# Patient Record
Sex: MALE | Race: WHITE | NOT HISPANIC OR LATINO | Employment: OTHER | URBAN - METROPOLITAN AREA
[De-identification: names, ages, dates, MRNs, and addresses within clinical notes are randomized per-mention and may not be internally consistent; named-entity substitution may affect disease eponyms.]

---

## 2023-06-16 LAB
ALANINE AMINOTRANSFERASE (SGPT) (U/L) IN SER/PLAS: 32 U/L (ref 10–52)
ALBUMIN (G/DL) IN SER/PLAS: 4.5 G/DL (ref 3.4–5)
ALBUMIN (MG/L) IN URINE: <7 MG/L
ALBUMIN/CREATININE (UG/MG) IN URINE: NORMAL UG/MG CRT (ref 0–30)
ALKALINE PHOSPHATASE (U/L) IN SER/PLAS: 52 U/L (ref 33–136)
ANION GAP IN SER/PLAS: 11 MMOL/L (ref 10–20)
ASPARTATE AMINOTRANSFERASE (SGOT) (U/L) IN SER/PLAS: 20 U/L (ref 9–39)
BASOPHILS (10*3/UL) IN BLOOD BY AUTOMATED COUNT: 0.03 X10E9/L (ref 0–0.1)
BASOPHILS/100 LEUKOCYTES IN BLOOD BY AUTOMATED COUNT: 0.6 % (ref 0–2)
BILIRUBIN TOTAL (MG/DL) IN SER/PLAS: 0.7 MG/DL (ref 0–1.2)
CALCIUM (MG/DL) IN SER/PLAS: 9.7 MG/DL (ref 8.6–10.3)
CARBON DIOXIDE, TOTAL (MMOL/L) IN SER/PLAS: 29 MMOL/L (ref 21–32)
CHLORIDE (MMOL/L) IN SER/PLAS: 106 MMOL/L (ref 98–107)
CHOLESTEROL (MG/DL) IN SER/PLAS: 160 MG/DL (ref 0–199)
CHOLESTEROL IN HDL (MG/DL) IN SER/PLAS: 59.1 MG/DL
CHOLESTEROL/HDL RATIO: 2.7
CREATININE (MG/DL) IN SER/PLAS: 1.34 MG/DL (ref 0.5–1.3)
CREATININE (MG/DL) IN URINE: 106 MG/DL (ref 20–370)
EOSINOPHILS (10*3/UL) IN BLOOD BY AUTOMATED COUNT: 0.14 X10E9/L (ref 0–0.7)
EOSINOPHILS/100 LEUKOCYTES IN BLOOD BY AUTOMATED COUNT: 2.9 % (ref 0–6)
ERYTHROCYTE DISTRIBUTION WIDTH (RATIO) BY AUTOMATED COUNT: 11.9 % (ref 11.5–14.5)
ERYTHROCYTE MEAN CORPUSCULAR HEMOGLOBIN CONCENTRATION (G/DL) BY AUTOMATED: 33.5 G/DL (ref 32–36)
ERYTHROCYTE MEAN CORPUSCULAR VOLUME (FL) BY AUTOMATED COUNT: 93 FL (ref 80–100)
ERYTHROCYTES (10*6/UL) IN BLOOD BY AUTOMATED COUNT: 4.64 X10E12/L (ref 4.5–5.9)
GFR MALE: 57 ML/MIN/1.73M2
GLUCOSE (MG/DL) IN SER/PLAS: 124 MG/DL (ref 74–99)
HEMATOCRIT (%) IN BLOOD BY AUTOMATED COUNT: 43.3 % (ref 41–52)
HEMOGLOBIN (G/DL) IN BLOOD: 14.5 G/DL (ref 13.5–17.5)
IMMATURE GRANULOCYTES/100 LEUKOCYTES IN BLOOD BY AUTOMATED COUNT: 0.4 % (ref 0–0.9)
LDL: 84 MG/DL (ref 0–99)
LEUKOCYTES (10*3/UL) IN BLOOD BY AUTOMATED COUNT: 4.9 X10E9/L (ref 4.4–11.3)
LYMPHOCYTES (10*3/UL) IN BLOOD BY AUTOMATED COUNT: 2.05 X10E9/L (ref 1.2–4.8)
LYMPHOCYTES/100 LEUKOCYTES IN BLOOD BY AUTOMATED COUNT: 41.9 % (ref 13–44)
MONOCYTES (10*3/UL) IN BLOOD BY AUTOMATED COUNT: 0.39 X10E9/L (ref 0.1–1)
MONOCYTES/100 LEUKOCYTES IN BLOOD BY AUTOMATED COUNT: 8 % (ref 2–10)
NEUTROPHILS (10*3/UL) IN BLOOD BY AUTOMATED COUNT: 2.26 X10E9/L (ref 1.2–7.7)
NEUTROPHILS/100 LEUKOCYTES IN BLOOD BY AUTOMATED COUNT: 46.2 % (ref 40–80)
PLATELETS (10*3/UL) IN BLOOD AUTOMATED COUNT: 196 X10E9/L (ref 150–450)
POTASSIUM (MMOL/L) IN SER/PLAS: 4.8 MMOL/L (ref 3.5–5.3)
PROTEIN TOTAL: 6.9 G/DL (ref 6.4–8.2)
SODIUM (MMOL/L) IN SER/PLAS: 141 MMOL/L (ref 136–145)
TRIGLYCERIDE (MG/DL) IN SER/PLAS: 87 MG/DL (ref 0–149)
URATE (MG/DL) IN SER/PLAS: 8 MG/DL (ref 4–7.5)
UREA NITROGEN (MG/DL) IN SER/PLAS: 24 MG/DL (ref 6–23)
VLDL: 17 MG/DL (ref 0–40)

## 2023-06-18 LAB
ESTIMATED AVERAGE GLUCOSE FOR HBA1C: 126 MG/DL
HEMOGLOBIN A1C/HEMOGLOBIN TOTAL IN BLOOD: 6 %

## 2023-12-20 ENCOUNTER — LAB (OUTPATIENT)
Dept: LAB | Facility: LAB | Age: 68
End: 2023-12-20
Payer: MEDICARE

## 2023-12-20 DIAGNOSIS — R73.09 OTHER ABNORMAL GLUCOSE: Primary | ICD-10-CM

## 2023-12-20 DIAGNOSIS — M1A.9XX1 CHRONIC GOUT, UNSPECIFIED, WITH TOPHUS (TOPHI): ICD-10-CM

## 2023-12-20 LAB
ANION GAP SERPL CALC-SCNC: 9 MMOL/L (ref 10–20)
BUN SERPL-MCNC: 18 MG/DL (ref 6–23)
CALCIUM SERPL-MCNC: 9.5 MG/DL (ref 8.6–10.3)
CHLORIDE SERPL-SCNC: 105 MMOL/L (ref 98–107)
CO2 SERPL-SCNC: 30 MMOL/L (ref 21–32)
CREAT SERPL-MCNC: 1.21 MG/DL (ref 0.5–1.3)
EST. AVERAGE GLUCOSE BLD GHB EST-MCNC: 123 MG/DL
GFR SERPL CREATININE-BSD FRML MDRD: 64 ML/MIN/1.73M*2
GLUCOSE SERPL-MCNC: 116 MG/DL (ref 74–99)
HBA1C MFR BLD: 5.9 %
POTASSIUM SERPL-SCNC: 4.3 MMOL/L (ref 3.5–5.3)
SODIUM SERPL-SCNC: 140 MMOL/L (ref 136–145)
URATE SERPL-MCNC: 6 MG/DL (ref 4–7.5)

## 2023-12-20 PROCEDURE — 36415 COLL VENOUS BLD VENIPUNCTURE: CPT

## 2023-12-20 PROCEDURE — 84550 ASSAY OF BLOOD/URIC ACID: CPT

## 2023-12-20 PROCEDURE — 83036 HEMOGLOBIN GLYCOSYLATED A1C: CPT

## 2023-12-20 PROCEDURE — 80048 BASIC METABOLIC PNL TOTAL CA: CPT

## 2023-12-22 ENCOUNTER — TELEPHONE (OUTPATIENT)
Dept: PRIMARY CARE | Facility: CLINIC | Age: 68
End: 2023-12-22
Payer: MEDICARE

## 2023-12-22 NOTE — TELEPHONE ENCOUNTER
----- Message from Marlene Cardozo MD sent at 12/20/2023  9:41 PM EST -----  Overdue for 3 month follow up from June  Please schedule at which time will review labs.  Labs overall ok

## 2024-01-16 ENCOUNTER — HOSPITAL ENCOUNTER (OUTPATIENT)
Dept: RADIOLOGY | Facility: EXTERNAL LOCATION | Age: 69
Discharge: HOME | End: 2024-01-16

## 2024-01-16 DIAGNOSIS — M25.532 WRIST PAIN, ACUTE, LEFT: ICD-10-CM

## 2024-01-19 ENCOUNTER — OFFICE VISIT (OUTPATIENT)
Dept: PRIMARY CARE | Facility: CLINIC | Age: 69
End: 2024-01-19
Payer: MEDICARE

## 2024-01-19 ENCOUNTER — LAB (OUTPATIENT)
Dept: LAB | Facility: LAB | Age: 69
End: 2024-01-19
Payer: MEDICARE

## 2024-01-19 VITALS
WEIGHT: 196.8 LBS | DIASTOLIC BLOOD PRESSURE: 81 MMHG | TEMPERATURE: 97.2 F | SYSTOLIC BLOOD PRESSURE: 134 MMHG | BODY MASS INDEX: 26.66 KG/M2 | HEIGHT: 72 IN | HEART RATE: 89 BPM

## 2024-01-19 DIAGNOSIS — M10.042 ACUTE IDIOPATHIC GOUT OF LEFT HAND: ICD-10-CM

## 2024-01-19 DIAGNOSIS — Z00.00 WELL ADULT HEALTH CHECK: ICD-10-CM

## 2024-01-19 DIAGNOSIS — M10.042 ACUTE IDIOPATHIC GOUT OF LEFT HAND: Primary | ICD-10-CM

## 2024-01-19 PROBLEM — M16.11 OSTEOARTHRITIS OF RIGHT HIP: Status: ACTIVE | Noted: 2022-07-11

## 2024-01-19 PROBLEM — M1A.9XX1 TOPHUS: Status: ACTIVE | Noted: 2022-12-21

## 2024-01-19 PROBLEM — N52.9 ERECTILE DYSFUNCTION: Status: ACTIVE | Noted: 2024-01-19

## 2024-01-19 PROBLEM — C44.519 BASAL CELL CARCINOMA OF SKIN OF OTHER PART OF TRUNK: Status: RESOLVED | Noted: 2021-04-26 | Resolved: 2024-01-19

## 2024-01-19 PROBLEM — D22.5 MELANOCYTIC NEVI OF TRUNK: Status: ACTIVE | Noted: 2021-02-24

## 2024-01-19 PROBLEM — J30.9 ALLERGIC RHINITIS: Status: ACTIVE | Noted: 2024-01-19

## 2024-01-19 PROBLEM — I25.10 ATHEROSCLEROSIS OF NATIVE CORONARY ARTERY OF NATIVE HEART WITHOUT ANGINA PECTORIS: Status: ACTIVE | Noted: 2019-01-02

## 2024-01-19 PROBLEM — D36.9 TUBULAR ADENOMA: Status: ACTIVE | Noted: 2024-01-19

## 2024-01-19 PROBLEM — I10 BENIGN ESSENTIAL HYPERTENSION: Status: ACTIVE | Noted: 2022-12-21

## 2024-01-19 PROBLEM — E78.5 HYPERLIPIDEMIA: Status: ACTIVE | Noted: 2024-01-19

## 2024-01-19 LAB
ALBUMIN SERPL BCP-MCNC: 4.2 G/DL (ref 3.4–5)
ALP SERPL-CCNC: 63 U/L (ref 33–136)
ALT SERPL W P-5'-P-CCNC: 14 U/L (ref 10–52)
ANION GAP SERPL CALC-SCNC: 13 MMOL/L (ref 10–20)
AST SERPL W P-5'-P-CCNC: 11 U/L (ref 9–39)
BASOPHILS # BLD AUTO: 0.06 X10*3/UL (ref 0–0.1)
BASOPHILS NFR BLD AUTO: 0.7 %
BILIRUB SERPL-MCNC: 0.5 MG/DL (ref 0–1.2)
BUN SERPL-MCNC: 24 MG/DL (ref 6–23)
CALCIUM SERPL-MCNC: 9.8 MG/DL (ref 8.6–10.3)
CHLORIDE SERPL-SCNC: 102 MMOL/L (ref 98–107)
CO2 SERPL-SCNC: 30 MMOL/L (ref 21–32)
CREAT SERPL-MCNC: 1.15 MG/DL (ref 0.5–1.3)
CRP SERPL-MCNC: 10.38 MG/DL
EGFRCR SERPLBLD CKD-EPI 2021: 68 ML/MIN/1.73M*2
EOSINOPHIL # BLD AUTO: 0.07 X10*3/UL (ref 0–0.7)
EOSINOPHIL NFR BLD AUTO: 0.8 %
ERYTHROCYTE [DISTWIDTH] IN BLOOD BY AUTOMATED COUNT: 12.4 % (ref 11.5–14.5)
GLUCOSE SERPL-MCNC: 108 MG/DL (ref 74–99)
HCT VFR BLD AUTO: 42.6 % (ref 41–52)
HGB BLD-MCNC: 14.3 G/DL (ref 13.5–17.5)
IMM GRANULOCYTES # BLD AUTO: 0.03 X10*3/UL (ref 0–0.7)
IMM GRANULOCYTES NFR BLD AUTO: 0.3 % (ref 0–0.9)
LYMPHOCYTES # BLD AUTO: 1.45 X10*3/UL (ref 1.2–4.8)
LYMPHOCYTES NFR BLD AUTO: 16.2 %
MCH RBC QN AUTO: 32.4 PG (ref 26–34)
MCHC RBC AUTO-ENTMCNC: 33.6 G/DL (ref 32–36)
MCV RBC AUTO: 97 FL (ref 80–100)
MONOCYTES # BLD AUTO: 0.95 X10*3/UL (ref 0.1–1)
MONOCYTES NFR BLD AUTO: 10.6 %
NEUTROPHILS # BLD AUTO: 6.39 X10*3/UL (ref 1.2–7.7)
NEUTROPHILS NFR BLD AUTO: 71.4 %
NRBC BLD-RTO: 0 /100 WBCS (ref 0–0)
PLATELET # BLD AUTO: 214 X10*3/UL (ref 150–450)
POTASSIUM SERPL-SCNC: 4.5 MMOL/L (ref 3.5–5.3)
PROT SERPL-MCNC: 7 G/DL (ref 6.4–8.2)
RBC # BLD AUTO: 4.41 X10*6/UL (ref 4.5–5.9)
SODIUM SERPL-SCNC: 140 MMOL/L (ref 136–145)
URATE SERPL-MCNC: 5.7 MG/DL (ref 4–7.5)
WBC # BLD AUTO: 9 X10*3/UL (ref 4.4–11.3)

## 2024-01-19 PROCEDURE — 1159F MED LIST DOCD IN RCRD: CPT | Performed by: FAMILY MEDICINE

## 2024-01-19 PROCEDURE — 1126F AMNT PAIN NOTED NONE PRSNT: CPT | Performed by: FAMILY MEDICINE

## 2024-01-19 PROCEDURE — 85025 COMPLETE CBC W/AUTO DIFF WBC: CPT

## 2024-01-19 PROCEDURE — 84550 ASSAY OF BLOOD/URIC ACID: CPT

## 2024-01-19 PROCEDURE — 3079F DIAST BP 80-89 MM HG: CPT | Performed by: FAMILY MEDICINE

## 2024-01-19 PROCEDURE — 99204 OFFICE O/P NEW MOD 45 MIN: CPT | Performed by: FAMILY MEDICINE

## 2024-01-19 PROCEDURE — 1036F TOBACCO NON-USER: CPT | Performed by: FAMILY MEDICINE

## 2024-01-19 PROCEDURE — 86140 C-REACTIVE PROTEIN: CPT

## 2024-01-19 PROCEDURE — 3075F SYST BP GE 130 - 139MM HG: CPT | Performed by: FAMILY MEDICINE

## 2024-01-19 PROCEDURE — 80053 COMPREHEN METABOLIC PANEL: CPT

## 2024-01-19 PROCEDURE — 36415 COLL VENOUS BLD VENIPUNCTURE: CPT

## 2024-01-19 RX ORDER — ROSUVASTATIN CALCIUM 10 MG/1
10 TABLET, COATED ORAL DAILY
COMMUNITY
Start: 2016-12-19 | End: 2024-04-30 | Stop reason: SDUPTHER

## 2024-01-19 RX ORDER — COLCHICINE 0.6 MG/1
TABLET ORAL
Qty: 12 TABLET | Refills: 1 | Status: SHIPPED | OUTPATIENT
Start: 2024-01-19 | End: 2024-03-07 | Stop reason: SDUPTHER

## 2024-01-19 RX ORDER — PREDNISONE 10 MG/1
TABLET ORAL
Qty: 30 TABLET | Refills: 0 | Status: SHIPPED | OUTPATIENT
Start: 2024-01-19 | End: 2024-02-01 | Stop reason: ALTCHOICE

## 2024-01-19 RX ORDER — ALLOPURINOL 100 MG/1
100 TABLET ORAL DAILY
COMMUNITY
End: 2024-02-06 | Stop reason: SDUPTHER

## 2024-01-19 RX ORDER — COLCHICINE 0.6 MG/1
0.6 CAPSULE ORAL DAILY
COMMUNITY
Start: 2022-12-21 | End: 2024-01-19 | Stop reason: WASHOUT

## 2024-01-19 RX ORDER — COLCHICINE 0.6 MG/1
0.6 TABLET ORAL DAILY
COMMUNITY
Start: 2023-01-30 | End: 2024-01-19 | Stop reason: SDUPTHER

## 2024-01-19 RX ORDER — TADALAFIL 10 MG/1
10 TABLET ORAL DAILY PRN
COMMUNITY

## 2024-01-19 RX ORDER — LISINOPRIL 40 MG/1
40 TABLET ORAL DAILY
COMMUNITY
End: 2024-02-06 | Stop reason: SDUPTHER

## 2024-01-19 RX ORDER — MONTELUKAST SODIUM 10 MG/1
10 TABLET ORAL NIGHTLY
COMMUNITY
Start: 2008-12-31 | End: 2024-04-30 | Stop reason: SDUPTHER

## 2024-01-19 RX ORDER — FLUTICASONE PROPIONATE 50 MCG
1 SPRAY, SUSPENSION (ML) NASAL DAILY
COMMUNITY
Start: 2021-12-02 | End: 2024-05-30 | Stop reason: SDUPTHER

## 2024-01-19 ASSESSMENT — ENCOUNTER SYMPTOMS
COLOR CHANGE: 1
FEVER: 0
SHORTNESS OF BREATH: 0
JOINT SWELLING: 1
CHILLS: 0
FATIGUE: 0
COUGH: 0
HEADACHES: 0
ARTHRALGIAS: 1

## 2024-01-19 ASSESSMENT — PATIENT HEALTH QUESTIONNAIRE - PHQ9
2. FEELING DOWN, DEPRESSED OR HOPELESS: NOT AT ALL
1. LITTLE INTEREST OR PLEASURE IN DOING THINGS: NOT AT ALL
SUM OF ALL RESPONSES TO PHQ9 QUESTIONS 1 AND 2: 0

## 2024-01-19 NOTE — PROGRESS NOTES
Subjective   Patient ID: Jarret Carrasco is a 68 y.o. male who presents for Arm Injury (Xray on Tuesday 01/16, tender, and red ).    HPI   Last Friday, struggled to open a bottle of wine, but no acute pain while doing this.  Saturday am, woke with pain and swelling in L hand.  Tx'dwith warm/cold compresses, elevated arm with sling, wrapped hand with ace wrap, tylenol, ibuprofen, and aleve-no relief with these interventions  Tuesday- visit-xray and wrist brace.   Worsening redness/pain this am.  Pain with any motion of the left hand at the wrist and MCP joints.    Review of Systems   Constitutional:  Negative for chills, fatigue and fever.   Respiratory:  Negative for cough and shortness of breath.    Cardiovascular:  Negative for chest pain.   Musculoskeletal:  Positive for arthralgias and joint swelling.   Skin:  Positive for color change.   Neurological:  Negative for headaches.       Objective   /81 (BP Location: Right arm, Patient Position: Sitting, BP Cuff Size: Large adult)   Pulse 89   Temp 36.2 °C (97.2 °F)   Ht 1.829 m (6')   Wt 89.3 kg (196 lb 12.8 oz)   BMI 26.69 kg/m²     Physical Exam  Constitutional:       General: He is not in acute distress.     Appearance: Normal appearance.   Cardiovascular:      Rate and Rhythm: Normal rate and regular rhythm.      Pulses: Normal pulses.      Heart sounds: Normal heart sounds.   Pulmonary:      Effort: Pulmonary effort is normal.      Breath sounds: Normal breath sounds.   Musculoskeletal:         General: Swelling and tenderness present.      Right hand: Normal.      Left hand: Swelling, tenderness and bony tenderness present. Decreased range of motion.      Comments: Erythema, edema, warmth to palpation across the left hand MCP joints.  Tenderness and mild swelling with palpation over the dorsum of the left wrist.  No swelling into the forearm.   Skin:     General: Skin is warm.      Findings: Erythema (Overlying the MCP joints of left hand, no  streaking into the hand or forearm.) present.   Neurological:      Mental Status: He is alert.     Reviewed x-ray from urgent care visit earlier this week    Assessment/Plan   Diagnoses and all orders for this visit:  Acute idiopathic gout of left hand  Proceed with lab work.  Anticipates results will be consistent with acute gout attack.  Initiate prednisone.  Instructed on proper use, administration potential adverse effects.  Take with food to minimize GI irritation.  Start colchicine.  After acute attack subsides, will likely need to adjust allopurinol dosing.  -     CBC and Auto Differential; Future  -     Uric acid; Future  -     C-reactive protein; Future  -     predniSONE (Deltasone) 10 mg tablet; Take 5 tablets (50 mg) by mouth once daily for 2 days, THEN 4 tablets (40 mg) once daily for 2 days, THEN 3 tablets (30 mg) once daily for 2 days, THEN 2 tablets (20 mg) once daily for 2 days, THEN 1 tablet (10 mg) once daily for 2 days.  -     colchicine 0.6 mg tablet; 1.2 mg po x1, then 0.6 mg po in 1 hours. Max 3 tabs per acute attack. Do not repeat for 72 hours.  Well adult health check  -     Comprehensive Metabolic Panel; Future

## 2024-01-22 ENCOUNTER — TELEPHONE (OUTPATIENT)
Dept: PRIMARY CARE | Facility: CLINIC | Age: 69
End: 2024-01-22
Payer: MEDICARE

## 2024-01-22 DIAGNOSIS — M19.90 INFLAMMATORY ARTHRITIS: Primary | ICD-10-CM

## 2024-01-22 RX ORDER — AMOXICILLIN AND CLAVULANATE POTASSIUM 875; 125 MG/1; MG/1
875 TABLET, FILM COATED ORAL 2 TIMES DAILY
Qty: 20 TABLET | Refills: 0 | Status: SHIPPED | OUTPATIENT
Start: 2024-01-22 | End: 2024-02-01 | Stop reason: ALTCHOICE

## 2024-01-22 NOTE — TELEPHONE ENCOUNTER
On the chance that there is brewing infection, add Augmentin.  Sent to his pharmacy.  Take with food to minimize GI upset.  Call if symptoms do not begin to improve over the next 48 to 72 hours or for any other acute changes in symptoms.

## 2024-01-22 NOTE — TELEPHONE ENCOUNTER
----- Message from Marlene Cardozo MD sent at 1/22/2024  9:07 AM EST -----  Uric acid level actually within normal range on recent lab work.  Symptoms may not be related to acute gout.  Inflammatory marker, CRP, quite high.  Please see if hand pain/swelling is still persistent and if any onset of fever or progression of redness and swelling.

## 2024-01-23 ENCOUNTER — TELEPHONE (OUTPATIENT)
Dept: PRIMARY CARE | Facility: CLINIC | Age: 69
End: 2024-01-23
Payer: MEDICARE

## 2024-01-23 NOTE — TELEPHONE ENCOUNTER
Pt's wife called and is wondering if he should still be taking the prednisone while taking the Augmentin. Pt has been taking the prednisone for 5 days and has 5 days left. Wife stated that the pt's hand is still very swollen and painful. Please advise. Thank you

## 2024-01-23 NOTE — TELEPHONE ENCOUNTER
Telephone call to patient, left voicemail, informed patient that Augmentin was sent to his pharmacy.  Advised patient to take with food to minimize GI upset.   Advised him to call if symptoms do not begin to improve over the next 48 to 72 hours or for any other acute changes in symptoms.

## 2024-01-26 ENCOUNTER — TELEPHONE (OUTPATIENT)
Dept: PRIMARY CARE | Facility: CLINIC | Age: 69
End: 2024-01-26
Payer: MEDICARE

## 2024-01-26 NOTE — TELEPHONE ENCOUNTER
Possible that it is pseduogout.  I can place referral to rheum if desired.  If sxs not improving or worsening, go to ED over weekend.

## 2024-01-26 NOTE — TELEPHONE ENCOUNTER
Wife called with update about hand. Has been on antibiotics for 60 hrs. Swelling has come down in all fingers except his index finger, but now it is starting to move.     In the past he was diagnosed with suto-gout. She wanted you to know as she is thinking that I it may be why the gout medication didn't work,

## 2024-01-26 NOTE — TELEPHONE ENCOUNTER
Spoke to pt and states it is getting a little better and only the one knuckle. Will wait at this time for a referral.

## 2024-01-29 ENCOUNTER — TELEPHONE (OUTPATIENT)
Dept: PRIMARY CARE | Facility: CLINIC | Age: 69
End: 2024-01-29
Payer: MEDICARE

## 2024-01-29 DIAGNOSIS — M19.90 INFLAMMATORY ARTHRITIS: Primary | ICD-10-CM

## 2024-01-29 NOTE — TELEPHONE ENCOUNTER
Patients hand is still swollen and not any better.  They are asking for the referral for the specialist.     Please send to [facility] to help schedule.

## 2024-01-30 ENCOUNTER — OFFICE VISIT (OUTPATIENT)
Dept: PRIMARY CARE | Facility: CLINIC | Age: 69
End: 2024-01-30
Payer: MEDICARE

## 2024-01-30 ENCOUNTER — LAB (OUTPATIENT)
Dept: LAB | Facility: LAB | Age: 69
End: 2024-01-30
Payer: MEDICARE

## 2024-01-30 VITALS
HEART RATE: 93 BPM | BODY MASS INDEX: 26.22 KG/M2 | TEMPERATURE: 96.8 F | HEIGHT: 72 IN | WEIGHT: 193.6 LBS | DIASTOLIC BLOOD PRESSURE: 74 MMHG | SYSTOLIC BLOOD PRESSURE: 117 MMHG

## 2024-01-30 DIAGNOSIS — M13.0 POLYARTHRITIS: Primary | ICD-10-CM

## 2024-01-30 DIAGNOSIS — M06.9 ACUTE RHEUMATOID ARTHRITIS (MULTI): ICD-10-CM

## 2024-01-30 DIAGNOSIS — M13.0 POLYARTHRITIS: ICD-10-CM

## 2024-01-30 PROBLEM — M25.539 PAIN IN WRIST: Status: ACTIVE | Noted: 2024-01-30

## 2024-01-30 LAB
CCP IGG SERPL-ACNC: <1 U/ML
CRP SERPL-MCNC: 0.32 MG/DL
ERYTHROCYTE [SEDIMENTATION RATE] IN BLOOD BY WESTERGREN METHOD: 6 MM/H (ref 0–20)
RHEUMATOID FACT SER NEPH-ACNC: <10 IU/ML (ref 0–15)

## 2024-01-30 PROCEDURE — 3074F SYST BP LT 130 MM HG: CPT | Performed by: INTERNAL MEDICINE

## 2024-01-30 PROCEDURE — 86200 CCP ANTIBODY: CPT

## 2024-01-30 PROCEDURE — 3078F DIAST BP <80 MM HG: CPT | Performed by: INTERNAL MEDICINE

## 2024-01-30 PROCEDURE — 36415 COLL VENOUS BLD VENIPUNCTURE: CPT

## 2024-01-30 PROCEDURE — 1126F AMNT PAIN NOTED NONE PRSNT: CPT | Performed by: INTERNAL MEDICINE

## 2024-01-30 PROCEDURE — 1160F RVW MEDS BY RX/DR IN RCRD: CPT | Performed by: INTERNAL MEDICINE

## 2024-01-30 PROCEDURE — 99214 OFFICE O/P EST MOD 30 MIN: CPT | Performed by: INTERNAL MEDICINE

## 2024-01-30 PROCEDURE — 86431 RHEUMATOID FACTOR QUANT: CPT

## 2024-01-30 PROCEDURE — 86140 C-REACTIVE PROTEIN: CPT

## 2024-01-30 PROCEDURE — 1159F MED LIST DOCD IN RCRD: CPT | Performed by: INTERNAL MEDICINE

## 2024-01-30 PROCEDURE — 85652 RBC SED RATE AUTOMATED: CPT

## 2024-01-30 PROCEDURE — 86038 ANTINUCLEAR ANTIBODIES: CPT

## 2024-01-30 PROCEDURE — 1036F TOBACCO NON-USER: CPT | Performed by: INTERNAL MEDICINE

## 2024-01-30 RX ORDER — PREDNISONE 10 MG/1
TABLET ORAL
Qty: 48 TABLET | Refills: 0 | Status: SHIPPED | OUTPATIENT
Start: 2024-01-30

## 2024-01-30 RX ORDER — TRAMADOL HYDROCHLORIDE 50 MG/1
50 TABLET ORAL EVERY 8 HOURS PRN
Qty: 15 TABLET | Refills: 0 | Status: SHIPPED | OUTPATIENT
Start: 2024-01-30 | End: 2024-02-01 | Stop reason: SDUPTHER

## 2024-01-30 ASSESSMENT — PATIENT HEALTH QUESTIONNAIRE - PHQ9
SUM OF ALL RESPONSES TO PHQ9 QUESTIONS 1 AND 2: 0
2. FEELING DOWN, DEPRESSED OR HOPELESS: NOT AT ALL
1. LITTLE INTEREST OR PLEASURE IN DOING THINGS: NOT AT ALL

## 2024-01-30 NOTE — RESULT ENCOUNTER NOTE
Remarkable as crp and esr sign of inflammation improved   Treatment as advised  Other test still pending

## 2024-01-30 NOTE — PROGRESS NOTES
Assessment/Plan   Problem List Items Addressed This Visit       Polyarthritis - Primary    Acute rheumatoid arthritis (CMS/HCC)   Clinically this is an acute polyarthritis of the left hand  Most likely explanation is acute rheumatoid arthritis with effect on x-ray she is seen as erosion and elevation of CRP  A gout recurrent can have some effect like this an acute gout can happen with normal serum uric acid level  I would like to do some lab work as ordered including rheumatoid arthritis factor and XU and will check the ESR and CRP  Will advise steroid to be used longer.  Until inflammation is better and the results are available then we can choose other disease modifying agent for the rheumatoid arthritis which could include sulfasalazine ,methotrexate or other agents  Advised to see rheumatologist they have made an appointment as well but appointment is in March not sooner than that  In the meantime they have an appointment with a primary care physician for further evaluation  Additionally have given tramadol for pain relief  It is possible that hand surgeon can give steroid injection locally if condition the months that  I anticipate the current dosing of the steroid will improve the situation significantly next few days  There is no obvious evidence of infection on clinical examination or appreciation and white cell count was normal  Answer all the questions  Discussed all the possibilities  Discussed the treatment plan    Subjective   Patient ID: Jarret Carrasco is a 68 y.o. male who presents for Hand Pain (Pain present for 3 weeks now.  Patient denies any injury to left hand.  X-rays have been done.  Was advised that no fracture was present, may be arthritis.  Has been using antibiotics with no relief.  Has not been sleeping and is requesting pain medication.  ).    Past Surgical History:   Procedure Laterality Date    OTHER SURGICAL HISTORY  09/28/2022    Hip replacement    OTHER SURGICAL HISTORY   2021    Hernia repair    OTHER SURGICAL HISTORY  2021    Colonoscopy    OTHER SURGICAL HISTORY  2021    Cataract surgery      No family history on file.   Social History     Socioeconomic History    Marital status:      Spouse name: Not on file    Number of children: Not on file    Years of education: Not on file    Highest education level: Not on file   Occupational History    Not on file   Tobacco Use    Smoking status: Former     Types: Cigarettes     Quit date: 1974     Years since quittin.1    Smokeless tobacco: Never   Substance and Sexual Activity    Alcohol use: Yes     Alcohol/week: 5.0 standard drinks of alcohol     Types: 5 Standard drinks or equivalent per week    Drug use: Not Currently    Sexual activity: Not on file   Other Topics Concern    Not on file   Social History Narrative    Not on file     Social Determinants of Health     Financial Resource Strain: Not on file   Food Insecurity: Not on file   Transportation Needs: Not on file   Physical Activity: Not on file   Stress: Not on file   Social Connections: Not on file   Intimate Partner Violence: Not on file   Housing Stability: Not on file      Patient has no known allergies.   Current Outpatient Medications   Medication Sig Dispense Refill    allopurinol (Zyloprim) 100 mg tablet Take 1 tablet (100 mg) by mouth once daily.      amoxicillin-pot clavulanate (Augmentin) 875-125 mg tablet Take 1 tablet (875 mg) by mouth 2 times a day for 10 days. 20 tablet 0    colchicine 0.6 mg tablet 1.2 mg po x1, then 0.6 mg po in 1 hours. Max 3 tabs per acute attack. Do not repeat for 72 hours. 12 tablet 1    fluticasone (Flonase) 50 mcg/actuation nasal spray Administer 1 spray into each nostril once daily.      lisinopril 40 mg tablet Take 1 tablet (40 mg) by mouth once daily. as directed      montelukast (Singulair) 10 mg tablet Take 1 tablet (10 mg) by mouth once daily at bedtime.      rosuvastatin (Crestor) 10 mg tablet Take 1  tablet (10 mg) by mouth once daily.      tadalafil (Cialis) 10 mg tablet Take 1 tablet (10 mg) by mouth once daily as needed.       No current facility-administered medications for this visit.      Vitals:    01/30/24 1033   BP: 117/74   BP Location: Right arm   Patient Position: Sitting   Pulse: 93   Temp: 36 °C (96.8 °F)   Weight: 87.8 kg (193 lb 9.6 oz)   Height: 1.829 m (6')      Problem List Items Addressed This Visit       Polyarthritis - Primary    Acute rheumatoid arthritis (CMS/HCC)      No orders of the defined types were placed in this encounter.       HPI  This is a very pleasant 68-year-old gentleman who apparently is a left hand and uses the left hand has a history of gout has been on allopurinol has also been given colchicine in the past for the gout attack which affected the right foot and the right hand in the past  This time it happened that suddenly he developed significant pain and swelling and inflammation of the left hand and could not use it and he still have difficulty in using it  He was seen in urgent care he was given his steroid which he did not use more than 3 days eyes the pain inflammation was worsening and antibiotic was advised in anticipation that it could well be infection or so  Condition has somewhat improved but pain is still persisting  He had an x-ray done in urgent care which shows erosion at the head of the metacarpal bones  He said that he is pain was in proximal interphalangeal joint metacarpophalangeal joint and carpal joints at the wrist in the left hand  No other joint affected at this time  The labs has shown the uric acid is near normal while on allopurinol  Also C-reactive protein was elevated more than 10    ROS as above otherwise negative  Past medical history reviewed  Social and family history reviewed  Allergies and medications reviewed  Recent labs reviewed  Vital signs reviewed    PHYSICAL EXAM  Examination of the hand shows the left hand he could bend on the  "finger and by the active muscle tone there was no pain but movement and on the wrist cause discomfort and there is tenderness in the metacarpophalangeal joint as well as wrist joint as well there was no tenderness in the palmar aspect of fascial aspect  Chest is clear heart sounds regular  Neuro awake and alert  X-ray reviewed shows erosion at the metacarpal heads consistent with previous effect      No results found for: \"PR1\", \"BMPR1A\", \"CMPLAS\", \"JL3WTERM\", \"KPSAT\"   Lab Results   Component Value Date    CHOL 160 06/16/2023    CHHDL 2.7 06/16/2023                "

## 2024-01-31 ENCOUNTER — TELEPHONE (OUTPATIENT)
Dept: PRIMARY CARE | Facility: CLINIC | Age: 69
End: 2024-01-31
Payer: MEDICARE

## 2024-01-31 LAB — ANA SER QL HEP2 SUBST: NEGATIVE

## 2024-01-31 NOTE — TELEPHONE ENCOUNTER
----- Message from Shola Frankel MD sent at 1/30/2024  3:52 PM EST -----  Remarkable as crp and esr sign of inflammation improved   Treatment as advised  Other test still pending

## 2024-01-31 NOTE — RESULT ENCOUNTER NOTE
Result of rheumatoid test is negative and esr and crp normalised   I hope things are better  Check with patient   He has an appointment with Dr cornell tomorrow aswell

## 2024-01-31 NOTE — TELEPHONE ENCOUNTER
----- Message from Shola Frankel MD sent at 1/31/2024  8:58 AM EST -----  Result of rheumatoid test is negative and esr and crp normalised   I hope things are better  Check with patient   He has an appointment with Dr cornell tomorrow aswell

## 2024-02-01 ENCOUNTER — OFFICE VISIT (OUTPATIENT)
Dept: PRIMARY CARE | Facility: CLINIC | Age: 69
End: 2024-02-01
Payer: MEDICARE

## 2024-02-01 ENCOUNTER — APPOINTMENT (OUTPATIENT)
Dept: PRIMARY CARE | Facility: CLINIC | Age: 69
End: 2024-02-01
Payer: MEDICARE

## 2024-02-01 VITALS
HEIGHT: 72 IN | TEMPERATURE: 97.2 F | DIASTOLIC BLOOD PRESSURE: 77 MMHG | WEIGHT: 194 LBS | SYSTOLIC BLOOD PRESSURE: 121 MMHG | BODY MASS INDEX: 26.28 KG/M2 | HEART RATE: 88 BPM

## 2024-02-01 DIAGNOSIS — Z51.81 ENCOUNTER FOR MEDICATION MONITORING: ICD-10-CM

## 2024-02-01 DIAGNOSIS — M13.0 POLYARTHRITIS: Primary | ICD-10-CM

## 2024-02-01 PROCEDURE — 80354 DRUG SCREENING FENTANYL: CPT

## 2024-02-01 PROCEDURE — 3078F DIAST BP <80 MM HG: CPT | Performed by: FAMILY MEDICINE

## 2024-02-01 PROCEDURE — 1159F MED LIST DOCD IN RCRD: CPT | Performed by: FAMILY MEDICINE

## 2024-02-01 PROCEDURE — 1160F RVW MEDS BY RX/DR IN RCRD: CPT | Performed by: FAMILY MEDICINE

## 2024-02-01 PROCEDURE — 80365 DRUG SCREENING OXYCODONE: CPT

## 2024-02-01 PROCEDURE — 3074F SYST BP LT 130 MM HG: CPT | Performed by: FAMILY MEDICINE

## 2024-02-01 PROCEDURE — 99213 OFFICE O/P EST LOW 20 MIN: CPT | Performed by: FAMILY MEDICINE

## 2024-02-01 PROCEDURE — 1126F AMNT PAIN NOTED NONE PRSNT: CPT | Performed by: FAMILY MEDICINE

## 2024-02-01 PROCEDURE — 80346 BENZODIAZEPINES1-12: CPT

## 2024-02-01 PROCEDURE — 80307 DRUG TEST PRSMV CHEM ANLYZR: CPT

## 2024-02-01 PROCEDURE — 80368 SEDATIVE HYPNOTICS: CPT

## 2024-02-01 PROCEDURE — 80361 OPIATES 1 OR MORE: CPT

## 2024-02-01 PROCEDURE — 80373 DRUG SCREENING TRAMADOL: CPT

## 2024-02-01 PROCEDURE — 82570 ASSAY OF URINE CREATININE: CPT

## 2024-02-01 PROCEDURE — 80358 DRUG SCREENING METHADONE: CPT

## 2024-02-01 PROCEDURE — 1036F TOBACCO NON-USER: CPT | Performed by: FAMILY MEDICINE

## 2024-02-01 RX ORDER — TRAMADOL HYDROCHLORIDE 50 MG/1
50 TABLET ORAL EVERY 8 HOURS PRN
Qty: 30 TABLET | Refills: 0 | Status: SHIPPED | OUTPATIENT
Start: 2024-02-04 | End: 2024-02-14

## 2024-02-01 ASSESSMENT — PATIENT HEALTH QUESTIONNAIRE - PHQ9
SUM OF ALL RESPONSES TO PHQ9 QUESTIONS 1 AND 2: 0
1. LITTLE INTEREST OR PLEASURE IN DOING THINGS: NOT AT ALL
2. FEELING DOWN, DEPRESSED OR HOPELESS: NOT AT ALL

## 2024-02-01 NOTE — PROGRESS NOTES
Subjective   Patient ID: Jarret Carrasco is a 68 y.o. male who presents for Hand Pain (X 3 weeks).    HPI   Here today for follow-up.  Persistence of left hand pain, stiffness.  Following initial visit with myself, did not take colchicine.  Had minimal improvement with prednisone taper.  Because of persistence of pain and redness, course of antibiotic therapy prescribed.  He completed this but had no significant change in symptoms.  Seen earlier this week by my colleague who ordered rheumatologic workup and resumed a longer prednisone taper.  Has achieved good pain relief with taking tramadol every 8 hours.  Notes significant return of pain as he approaches the end of 8-hour interval.  Still has significant stiffness in the left wrist and MCP joints.  Redness and warmth have reduced but not resolved.    Review of Systems   Constitutional:  Negative for fatigue and fever.   HENT:  Negative for mouth sores.    Respiratory:  Negative for cough, chest tightness and shortness of breath.    Cardiovascular:  Negative for chest pain and palpitations.   Gastrointestinal:  Negative for abdominal pain and blood in stool.   Musculoskeletal:  Positive for arthralgias and joint swelling.   Neurological:  Negative for headaches.   Hematological:  Negative for adenopathy.       Objective   /77 (BP Location: Left arm, Patient Position: Sitting, BP Cuff Size: Large adult)   Pulse 88   Temp 36.2 °C (97.2 °F)   Ht 1.829 m (6')   Wt 88 kg (194 lb)   BMI 26.31 kg/m²     Physical Exam  Cardiovascular:      Rate and Rhythm: Normal rate and regular rhythm.      Heart sounds: Normal heart sounds.   Pulmonary:      Effort: Pulmonary effort is normal.      Breath sounds: Normal breath sounds.   Musculoskeletal:      Right wrist: Normal.      Left wrist: Swelling, effusion and bony tenderness present. Decreased range of motion.      Right hand: Normal.      Left hand: Swelling, tenderness and bony tenderness present. Decreased  range of motion.      Comments: Pain in the left hand with palpation across the MCP joints of second through fifth fingers.  Pain with range of motion of the wrist       1/30/2024 1132 01/30/2024 1531 C-reactive protein [[id number]]   Blood, Venous    Component Value Units   C-Reactive Protein 0.32 mg/dL          01/30/2024 1132 01/30/2024 1521 Sedimentation Rate [[id number]]   Blood, Venous    Component Value Units   Sedimentation Rate 6 mm/h          01/30/2024 1132 01/31/2024 1517 XU with Reflex to JANAE [[id number]]   Blood, Venous    Component Value   XU Negative           01/30/2024 1132 01/30/2024 2351 Citrulline Antibody, IgG [[id number]]    Blood, Venous    Component Value Units   Citrulline Antibody, IgG <1  U/mL          01/30/2024 1132 01/30/2024 2014 Rheumatoid factor [[id number]]   Blood, Venous    Component Value Units   Rheumatoid Factor <10 IU/m          Assessment/Plan   Diagnoses and all orders for this visit:  Polyarthritis  Most likely seronegative RA based on x-ray findings and symptoms.  However, could try taking course of colchicine to see if this provides additional relief  Continue prednisone taper as prescribed by my colleague  Counseled to take with food to minimize GI irritation.  If any increased heartburn, recommend starting OTC famotidine or omeprazole  Controlled substance agreement reviewed and urine drug screen obtained.  Refill of tramadol provided to continue taking as needed for acute pain relief  Keep pending rheumatology appointment  -     Opiate/Opioid/Benzo Prescription Compliance  -     Tramadol Confirmation; Future  -     traMADol (Ultram) 50 mg tablet; Take 1 tablet (50 mg) by mouth every 8 hours if needed for severe pain (7 - 10) for up to 10 days. Do not start before February 4, 2024.  -     OOB Internal Tracking  Encounter for medication monitoring  -     Opiate/Opioid/Benzo Prescription Compliance  -     Tramadol Confirmation; Future  -     OOB Internal Tracking

## 2024-02-02 LAB
AMPHETAMINES UR QL SCN: NORMAL
BARBITURATES UR QL SCN: NORMAL
BZE UR QL SCN: NORMAL
CANNABINOIDS UR QL SCN: NORMAL
CREAT UR-MCNC: 182.1 MG/DL (ref 20–370)
PCP UR QL SCN: NORMAL

## 2024-02-02 ASSESSMENT — ENCOUNTER SYMPTOMS
BLOOD IN STOOL: 0
ADENOPATHY: 0
FEVER: 0
COUGH: 0
CHEST TIGHTNESS: 0
PALPITATIONS: 0
ARTHRALGIAS: 1
JOINT SWELLING: 1
ABDOMINAL PAIN: 0
FATIGUE: 0
SHORTNESS OF BREATH: 0
HEADACHES: 0

## 2024-02-06 DIAGNOSIS — M10.042 ACUTE IDIOPATHIC GOUT OF LEFT HAND: Primary | ICD-10-CM

## 2024-02-06 DIAGNOSIS — I10 BENIGN ESSENTIAL HYPERTENSION: ICD-10-CM

## 2024-02-06 RX ORDER — LISINOPRIL 40 MG/1
40 TABLET ORAL DAILY
Qty: 90 TABLET | Refills: 0 | Status: SHIPPED | OUTPATIENT
Start: 2024-02-06 | End: 2024-04-30 | Stop reason: SDUPTHER

## 2024-02-06 RX ORDER — ALLOPURINOL 100 MG/1
100 TABLET ORAL DAILY
Qty: 90 TABLET | Refills: 0 | Status: SHIPPED | OUTPATIENT
Start: 2024-02-06 | End: 2024-04-30 | Stop reason: SDUPTHER

## 2024-02-06 NOTE — TELEPHONE ENCOUNTER
T/c from pt's wife, pt wanted to update Dr. Cardozo on his hand, Pt has been taking prednisone for his hand, his wife reports that the swelling in the hand has gone down, the index finger joint is still pretty swollen pain has gone down but he is still having pain in index and ring finger.

## 2024-02-06 NOTE — TELEPHONE ENCOUNTER
Refills approved.  Suspect some of the residual pain is related to the erosive bony changes seen on xray.  Please check with [facility]ate to see if rheum appt can be moved up at all.

## 2024-02-07 LAB
1OH-MIDAZOLAM UR CFM-MCNC: <25 NG/ML
6MAM UR CFM-MCNC: <25 NG/ML
7AMINOCLONAZEPAM UR CFM-MCNC: <25 NG/ML
A-OH ALPRAZ UR CFM-MCNC: <25 NG/ML
ALPRAZ UR CFM-MCNC: <25 NG/ML
CHLORDIAZEP UR CFM-MCNC: <25 NG/ML
CLONAZEPAM UR CFM-MCNC: <25 NG/ML
CODEINE UR CFM-MCNC: <50 NG/ML
DIAZEPAM UR CFM-MCNC: <25 NG/ML
EDDP UR CFM-MCNC: <25 NG/ML
FENTANYL UR CFM-MCNC: <2.5 NG/ML
HYDROCODONE CTO UR CFM-MCNC: <25 NG/ML
HYDROMORPHONE UR CFM-MCNC: <25 NG/ML
LORAZEPAM UR CFM-MCNC: <25 NG/ML
METHADONE UR CFM-MCNC: <25 NG/ML
MIDAZOLAM UR CFM-MCNC: <25 NG/ML
MORPHINE UR CFM-MCNC: <50 NG/ML
NORDIAZEPAM UR CFM-MCNC: <25 NG/ML
NORFENTANYL UR CFM-MCNC: <2.5 NG/ML
NORHYDROCODONE UR CFM-MCNC: <25 NG/ML
NOROXYCODONE UR CFM-MCNC: <25 NG/ML
NORTRAMADOL UR-MCNC: >1000 NG/ML
OXAZEPAM UR CFM-MCNC: <25 NG/ML
OXYCODONE UR CFM-MCNC: <25 NG/ML
OXYMORPHONE UR CFM-MCNC: <25 NG/ML
TEMAZEPAM UR CFM-MCNC: <25 NG/ML
TRAMADOL UR CFM-MCNC: >1000 NG/ML
ZOLPIDEM UR CFM-MCNC: <25 NG/ML
ZOLPIDEM UR-MCNC: <25 NG/ML

## 2024-02-16 RX ORDER — LISINOPRIL 40 MG/1
40 TABLET ORAL DAILY
Qty: 90 TABLET | Refills: 0 | OUTPATIENT
Start: 2024-02-16

## 2024-03-07 ENCOUNTER — HOSPITAL ENCOUNTER (OUTPATIENT)
Dept: RADIOLOGY | Facility: CLINIC | Age: 69
Discharge: HOME | End: 2024-03-07
Payer: MEDICARE

## 2024-03-07 ENCOUNTER — LAB (OUTPATIENT)
Dept: LAB | Facility: LAB | Age: 69
End: 2024-03-07
Payer: MEDICARE

## 2024-03-07 ENCOUNTER — OFFICE VISIT (OUTPATIENT)
Dept: RHEUMATOLOGY | Facility: CLINIC | Age: 69
End: 2024-03-07
Payer: MEDICARE

## 2024-03-07 VITALS
WEIGHT: 199 LBS | BODY MASS INDEX: 26.95 KG/M2 | SYSTOLIC BLOOD PRESSURE: 138 MMHG | HEART RATE: 83 BPM | DIASTOLIC BLOOD PRESSURE: 82 MMHG | HEIGHT: 72 IN | TEMPERATURE: 97.3 F

## 2024-03-07 DIAGNOSIS — Z79.899 HIGH RISK MEDICATION USE: ICD-10-CM

## 2024-03-07 DIAGNOSIS — M10.042 ACUTE IDIOPATHIC GOUT OF LEFT HAND: ICD-10-CM

## 2024-03-07 DIAGNOSIS — M19.90 INFLAMMATORY ARTHRITIS: ICD-10-CM

## 2024-03-07 DIAGNOSIS — M1A.9XX0 CHRONIC GOUT WITHOUT TOPHUS, UNSPECIFIED CAUSE, UNSPECIFIED SITE: ICD-10-CM

## 2024-03-07 DIAGNOSIS — Z79.899 HIGH RISK MEDICATION USE: Primary | ICD-10-CM

## 2024-03-07 LAB
ALBUMIN SERPL BCP-MCNC: 4.3 G/DL (ref 3.4–5)
ALP SERPL-CCNC: 59 U/L (ref 33–136)
ALT SERPL W P-5'-P-CCNC: 16 U/L (ref 10–52)
ANION GAP SERPL CALC-SCNC: 14 MMOL/L (ref 10–20)
AST SERPL W P-5'-P-CCNC: 13 U/L (ref 9–39)
BASOPHILS # BLD AUTO: 0.05 X10*3/UL (ref 0–0.1)
BASOPHILS NFR BLD AUTO: 0.9 %
BILIRUB SERPL-MCNC: 0.5 MG/DL (ref 0–1.2)
BUN SERPL-MCNC: 28 MG/DL (ref 6–23)
CALCIUM SERPL-MCNC: 10.1 MG/DL (ref 8.6–10.6)
CHLORIDE SERPL-SCNC: 102 MMOL/L (ref 98–107)
CO2 SERPL-SCNC: 29 MMOL/L (ref 21–32)
CREAT SERPL-MCNC: 1.31 MG/DL (ref 0.5–1.3)
CRP SERPL-MCNC: 0.93 MG/DL
EGFRCR SERPLBLD CKD-EPI 2021: 59 ML/MIN/1.73M*2
EOSINOPHIL # BLD AUTO: 0.09 X10*3/UL (ref 0–0.7)
EOSINOPHIL NFR BLD AUTO: 1.6 %
ERYTHROCYTE [DISTWIDTH] IN BLOOD BY AUTOMATED COUNT: 12.3 % (ref 11.5–14.5)
ERYTHROCYTE [SEDIMENTATION RATE] IN BLOOD BY WESTERGREN METHOD: 20 MM/H (ref 0–20)
GLUCOSE SERPL-MCNC: 103 MG/DL (ref 74–99)
HBV CORE AB SER QL: NONREACTIVE
HBV SURFACE AB SER-ACNC: <3.1 MIU/ML
HBV SURFACE AG SERPL QL IA: NONREACTIVE
HCT VFR BLD AUTO: 41 % (ref 41–52)
HCV AB SER QL: NONREACTIVE
HGB BLD-MCNC: 13.8 G/DL (ref 13.5–17.5)
IMM GRANULOCYTES # BLD AUTO: 0.03 X10*3/UL (ref 0–0.7)
IMM GRANULOCYTES NFR BLD AUTO: 0.5 % (ref 0–0.9)
LYMPHOCYTES # BLD AUTO: 1.53 X10*3/UL (ref 1.2–4.8)
LYMPHOCYTES NFR BLD AUTO: 26.4 %
MCH RBC QN AUTO: 31.7 PG (ref 26–34)
MCHC RBC AUTO-ENTMCNC: 33.7 G/DL (ref 32–36)
MCV RBC AUTO: 94 FL (ref 80–100)
MONOCYTES # BLD AUTO: 0.56 X10*3/UL (ref 0.1–1)
MONOCYTES NFR BLD AUTO: 9.7 %
NEUTROPHILS # BLD AUTO: 3.53 X10*3/UL (ref 1.2–7.7)
NEUTROPHILS NFR BLD AUTO: 60.9 %
NRBC BLD-RTO: 0 /100 WBCS (ref 0–0)
PLATELET # BLD AUTO: 263 X10*3/UL (ref 150–450)
POTASSIUM SERPL-SCNC: 5.2 MMOL/L (ref 3.5–5.3)
PROT SERPL-MCNC: 6.7 G/DL (ref 6.4–8.2)
RBC # BLD AUTO: 4.35 X10*6/UL (ref 4.5–5.9)
SODIUM SERPL-SCNC: 140 MMOL/L (ref 136–145)
URATE SERPL-MCNC: 6.1 MG/DL (ref 4–7.5)
WBC # BLD AUTO: 5.8 X10*3/UL (ref 4.4–11.3)

## 2024-03-07 PROCEDURE — 36415 COLL VENOUS BLD VENIPUNCTURE: CPT

## 2024-03-07 PROCEDURE — 73100 X-RAY EXAM OF WRIST: CPT | Mod: 50

## 2024-03-07 PROCEDURE — 73620 X-RAY EXAM OF FOOT: CPT | Mod: BILATERAL PROCEDURE | Performed by: RADIOLOGY

## 2024-03-07 PROCEDURE — 73120 X-RAY EXAM OF HAND: CPT | Mod: 50

## 2024-03-07 PROCEDURE — 80053 COMPREHEN METABOLIC PANEL: CPT

## 2024-03-07 PROCEDURE — 1126F AMNT PAIN NOTED NONE PRSNT: CPT | Performed by: STUDENT IN AN ORGANIZED HEALTH CARE EDUCATION/TRAINING PROGRAM

## 2024-03-07 PROCEDURE — 85652 RBC SED RATE AUTOMATED: CPT

## 2024-03-07 PROCEDURE — 99205 OFFICE O/P NEW HI 60 MIN: CPT | Performed by: STUDENT IN AN ORGANIZED HEALTH CARE EDUCATION/TRAINING PROGRAM

## 2024-03-07 PROCEDURE — 86140 C-REACTIVE PROTEIN: CPT

## 2024-03-07 PROCEDURE — 86481 TB AG RESPONSE T-CELL SUSP: CPT

## 2024-03-07 PROCEDURE — 87340 HEPATITIS B SURFACE AG IA: CPT

## 2024-03-07 PROCEDURE — 3075F SYST BP GE 130 - 139MM HG: CPT | Performed by: STUDENT IN AN ORGANIZED HEALTH CARE EDUCATION/TRAINING PROGRAM

## 2024-03-07 PROCEDURE — 73120 X-RAY EXAM OF HAND: CPT | Mod: BILATERAL PROCEDURE | Performed by: RADIOLOGY

## 2024-03-07 PROCEDURE — 86704 HEP B CORE ANTIBODY TOTAL: CPT

## 2024-03-07 PROCEDURE — 73620 X-RAY EXAM OF FOOT: CPT | Mod: 50

## 2024-03-07 PROCEDURE — 3079F DIAST BP 80-89 MM HG: CPT | Performed by: STUDENT IN AN ORGANIZED HEALTH CARE EDUCATION/TRAINING PROGRAM

## 2024-03-07 PROCEDURE — 1160F RVW MEDS BY RX/DR IN RCRD: CPT | Performed by: STUDENT IN AN ORGANIZED HEALTH CARE EDUCATION/TRAINING PROGRAM

## 2024-03-07 PROCEDURE — 86803 HEPATITIS C AB TEST: CPT

## 2024-03-07 PROCEDURE — 86706 HEP B SURFACE ANTIBODY: CPT

## 2024-03-07 PROCEDURE — 84550 ASSAY OF BLOOD/URIC ACID: CPT

## 2024-03-07 PROCEDURE — 85025 COMPLETE CBC W/AUTO DIFF WBC: CPT

## 2024-03-07 PROCEDURE — 1036F TOBACCO NON-USER: CPT | Performed by: STUDENT IN AN ORGANIZED HEALTH CARE EDUCATION/TRAINING PROGRAM

## 2024-03-07 PROCEDURE — 1159F MED LIST DOCD IN RCRD: CPT | Performed by: STUDENT IN AN ORGANIZED HEALTH CARE EDUCATION/TRAINING PROGRAM

## 2024-03-07 RX ORDER — PREDNISONE 5 MG/1
TABLET ORAL
Qty: 48 TABLET | Refills: 0 | Status: SHIPPED | OUTPATIENT
Start: 2024-03-07 | End: 2024-03-21

## 2024-03-07 RX ORDER — COLCHICINE 0.6 MG/1
TABLET ORAL
Qty: 12 TABLET | Refills: 1 | Status: SHIPPED | OUTPATIENT
Start: 2024-03-07

## 2024-03-07 RX ORDER — HYDROXYCHLOROQUINE SULFATE 200 MG/1
200 TABLET, FILM COATED ORAL 2 TIMES DAILY
Qty: 60 TABLET | Refills: 2 | Status: SHIPPED | OUTPATIENT
Start: 2024-03-07 | End: 2024-04-04 | Stop reason: SDUPTHER

## 2024-03-07 NOTE — PATIENT INSTRUCTIONS
Hydroxychloroquine counselling:  Educate the patient about the potential side effects of using antimalarials. Serious side effects are extremely rare. However, the fear of vision-threatening toxic retinopathy remains a major concern, this can be avoided when using the appropriate doses along with routine ocular monitoring.

## 2024-03-07 NOTE — PROGRESS NOTES
Rheumatology New Outpatient  Note    Subjective   Jarret Carrasco is a 68 y.o. male presenting today for Arthritis and Gout.    History of Presenting Problem:   Jarret  with PMHx of CAD, HTN, gout, HLP, Allergic rhinitis, ?RA, is presenting today as a NP for inflammatory arthritis    He was diagnosed with gout many years ago. Ir usually affects his bog toes, ankles and sometimes the hands. He is on allopurinol 100 mg daily, colchicine 0.6 mg as needed. No kidney stones, no skin tophi. Alcohol usually triggers that.     For the past few months he started noticing stiffness in both hands and puffiness. Since 2024 he starting having left wrist pain and swelling that extended to his whole hand. He was seen by ortho and had x-rays and told him it could be RA.  He was given a medrol ailyn which didn't help then he was given 3 weeks of prednisone which seemed to help.     Now he continues to have pain and stiffness in his left hand/wrist>right. He also has pain in his left big toe.     No eye inflammation  No GI inflammation  No enthesitis  No psoriasis  No sicca symptoms  No neuropathy  No ILD  No Raynaud's  No blood clots  No diverticulitis      Laboratory Testin2024: CMP normal, CBC normal, ESR normal, XU-, RF-, CCP-    Left wrist x-ray 2024:  IMPRESSION:  No acute osseous findings of the left wrist.  Erosions involving the metacarpal heads which could reflect  rheumatoid or other inflammatory arthritis    Past Medical History:   Past Medical History:   Diagnosis Date    Abnormal results of kidney function studies     Decreased GFR    Encounter for other preprocedural examination 2022    Preoperative clearance    Impacted cerumen, bilateral     Impacted cerumen, bilateral    Laceration without foreign body of unspecified hand, initial encounter     Hand laceration    Personal history of other diseases of the nervous system and sense organs     History of acute otitis media    Personal history of other  diseases of the respiratory system 05/29/2017    History of acute bronchitis    Sebaceous cyst     Infected sebaceous cyst       Allergies: No Known Allergies    Medications:   Current Outpatient Medications:     allopurinol (Zyloprim) 100 mg tablet, Take 1 tablet (100 mg) by mouth once daily., Disp: 90 tablet, Rfl: 0    colchicine 0.6 mg tablet, 1.2 mg po x1, then 0.6 mg po in 1 hours. Max 3 tabs per acute attack. Do not repeat for 72 hours., Disp: 12 tablet, Rfl: 1    fluticasone (Flonase) 50 mcg/actuation nasal spray, Administer 1 spray into each nostril once daily., Disp: , Rfl:     lisinopril 40 mg tablet, Take 1 tablet (40 mg) by mouth once daily. as directed, Disp: 90 tablet, Rfl: 0    montelukast (Singulair) 10 mg tablet, Take 1 tablet (10 mg) by mouth once daily at bedtime., Disp: , Rfl:     predniSONE (Deltasone) 10 mg tablet, 4tabs daily for 4 days then 3 tabs daily for 4days then 2 tabs daily for next 10 days before further to decide, Disp: 48 tablet, Rfl: 0    rosuvastatin (Crestor) 10 mg tablet, Take 1 tablet (10 mg) by mouth once daily., Disp: , Rfl:     tadalafil (Cialis) 10 mg tablet, Take 1 tablet (10 mg) by mouth once daily as needed., Disp: , Rfl:     Review of Systems:   Constitutional: Denies fever, chills   Eyes: Denies dry eyes, pain in the eyes   ENT: Denies dry mouth, loss of taste, sores in the mouth  Cardiovascular: Denies chest pain, palpitations   Respiratory: Denies shortness of breath   Gastrointestinal: Denies heartburn   Integumentary: Denies photosensitivity, rash or lesions, Raynaud's   Neurological: Denies any numbness or tingling    MSK: As per HPI.     All 10 review of systems have been reviewed and are negative for complaint except as noted in the HPI    Objective   Physical Examination:  Vitals:    03/07/24 0921   BP: 138/82   Pulse: 83   Temp: 36.3 °C (97.3 °F)     Growth %ile SmartLinks can only be used for patients less than 20 years old.  Ht Readings from Last 1  Encounters:   03/07/24 1.829 m (6')     Wt Readings from Last 1 Encounters:   03/07/24 90.3 kg (199 lb)       General - NAD, sitting up in chair, well-groomed, pleasant, AAOx3  Head: Normocephalic, atraumatic  Eyes - PERRLA, EOMI. No conjunctiva injection.   Mouth/ENT - Moist oral and nasal mucosa. No facial rash.   Cardiovascular - Normal S1, S2. Regular rate and rhythm. No murmurs or rubs.  Lungs - Symmetric chest expansion. Clear to auscultation bilaterally.   Skin - No rashes or ulcers. Skin warm and dry. No erythema on bilateral cheeks.  Extremities - No edema, cyanosis ,or clubbing  Neurological - Alert and oriented x 3,  grossly intact. No focal deficit.  Musculoskeletal -  Shoulders: Full ROM, without pain, no swelling, warmth or tenderness.  Elbows: Full ROM, without pain, no swelling, warmth or tenderness.  Wrists: mild left wrist tenderness and swelling.   MCP: No swelling, warmth or tenderness. Metacarpal squeeze negative  PIP: No swelling, warmth or tenderness.  DIP: No swelling, warmth or tenderness.  Hands : 5/5.    Sacroiliac joints: No local tenderness. MAU negative.   Hips: Full ROM.  No malalignment.  Knees:  Full ROM, without pain, no swelling, warmth or point tenderness.   Ankles: Full ROM, without pain, swelling, warmth or tenderness.  Toes:  Metatarsal squeeze negative  Cervical spine: No tenderness or limitation of movement  Lumbar spine: No tenderness or limitation of movement        Assessment/Plan   Jarret Carrasco is a 68 y.o. male with PMHx of CAD, gout, HTN, HLP, Allergic rhinitis, ?RA,  who is presenting today as a NP for inflammatory arthritis    ##Inflammatory arthritis mainly in hands and left hand x-ray concerning for erosive changes. ESR/CRP/RF/CCP negative but he was on steroids so it may mask ESR/CRP. He has chronic gout but this inflammation in his hand is now different. He responded to steroids previously. On exam he has swelling of the left wrist. Recommend to repeat  ESR/CRP and will start prednisone taper and HCQ trial. If he responds well then will need to start OCT monitoring. I will check TB/hepatitis in preparation for antibiotics. RTC 4-5 weeks and may need an MRI of the Left wrist/hand    The assessment and plan, risk and benefits were discussed with the patient. All of the patients questions were answered and patient agrees to the plan.      Selam Carter MD  Clinical   Department of Rheumatology   [facility]

## 2024-03-09 LAB
NIL(NEG) CONTROL SPOT COUNT: NORMAL
PANEL A SPOT COUNT: 0
PANEL B SPOT COUNT: 0
POS CONTROL SPOT COUNT: NORMAL
T-SPOT. TB INTERPRETATION: NEGATIVE

## 2024-04-03 NOTE — PROGRESS NOTES
Rheumatology Outpatient Follow up Note    Subjective   Jarret Carrasco is a 68 y.o. male presenting today for Arthritis.    History of Presenting Problem:   Rheum history:   He was diagnosed with gout many years ago. It usually affects his big toes, ankles and sometimes the hands. He is on allopurinol 100 mg daily, colchicine 0.6 mg as needed. No kidney stones, no skin tophi. Alcohol usually triggers that.     For the past few months he started noticing stiffness in both hands and puffiness. Since 1/2024 he starting having left wrist pain and swelling that extended to his whole hand. He was seen by ortho and had x-rays and told him it could be RA.  He was given a medrol ailyn which didn't help then he was given 3 weeks of prednisone which seemed to help.     Now he continues to have pain and stiffness in his left hand/wrist>right. He also has pain in his left big toe.     No eye inflammation  No GI inflammation  No enthesitis  No psoriasis  No sicca symptoms  No neuropathy  No ILD  No Raynaud's  No blood clots  No diverticulitis      Interval history:   His hand pain/swelling and stiffness has improved after 4 days of prednisone. He is on prednisone and tolerating HCQ. No new symptoms      Past Medical History:   Past Medical History:   Diagnosis Date    Abnormal results of kidney function studies     Decreased GFR    Encounter for other preprocedural examination 02/21/2022    Preoperative clearance    Impacted cerumen, bilateral     Impacted cerumen, bilateral    Laceration without foreign body of unspecified hand, initial encounter     Hand laceration    Personal history of other diseases of the nervous system and sense organs     History of acute otitis media    Personal history of other diseases of the respiratory system 05/29/2017    History of acute bronchitis    Sebaceous cyst     Infected sebaceous cyst       Allergies: No Known Allergies    Medications:   Current Outpatient Medications:     allopurinol  (Zyloprim) 100 mg tablet, Take 1 tablet (100 mg) by mouth once daily., Disp: 90 tablet, Rfl: 0    colchicine 0.6 mg tablet, 1.2 mg po x1, then 0.6 mg po in 1 hours. Max 3 tabs per acute attack. Do not repeat for 72 hours., Disp: 12 tablet, Rfl: 1    fluticasone (Flonase) 50 mcg/actuation nasal spray, Administer 1 spray into each nostril once daily., Disp: , Rfl:     hydroxychloroquine (Plaquenil) 200 mg tablet, Take 1 tablet (200 mg) by mouth 2 times a day., Disp: 60 tablet, Rfl: 2    lisinopril 40 mg tablet, Take 1 tablet (40 mg) by mouth once daily. as directed, Disp: 90 tablet, Rfl: 0    montelukast (Singulair) 10 mg tablet, Take 1 tablet (10 mg) by mouth once daily at bedtime., Disp: , Rfl:     predniSONE (Deltasone) 10 mg tablet, 4tabs daily for 4 days then 3 tabs daily for 4days then 2 tabs daily for next 10 days before further to decide, Disp: 48 tablet, Rfl: 0    rosuvastatin (Crestor) 10 mg tablet, Take 1 tablet (10 mg) by mouth once daily., Disp: , Rfl:     tadalafil (Cialis) 10 mg tablet, Take 1 tablet (10 mg) by mouth once daily as needed., Disp: , Rfl:     Review of Systems:   Constitutional: Denies fever, chills   Eyes: Denies dry eyes, pain in the eyes   ENT: Denies dry mouth, loss of taste, sores in the mouth  Cardiovascular: Denies chest pain, palpitations   Respiratory: Denies shortness of breath   Gastrointestinal: Denies heartburn   Integumentary: Denies photosensitivity, rash or lesions, Raynaud's   Neurological: Denies any numbness or tingling    MSK: As per HPI.     All 10 review of systems have been reviewed and are negative for complaint except as noted in the HPI    Objective   Physical Examination:  Vitals:    04/04/24 1322   BP: 130/86   Pulse: 86   Temp: 36.2 °C (97.2 °F)       Growth %ile SmartLinks can only be used for patients less than 20 years old.  Ht Readings from Last 1 Encounters:   04/04/24 1.829 m (6')     Wt Readings from Last 1 Encounters:   04/04/24 91.5 kg (201 lb 12.8 oz)        General - NAD, sitting up in chair, well-groomed, pleasant, AAOx3  Head: Normocephalic, atraumatic  Eyes - PERRLA, EOMI. No conjunctiva injection.   Mouth/ENT - Moist oral and nasal mucosa. No facial rash.   Cardiovascular - Normal S1, S2. Regular rate and rhythm. No murmurs or rubs.  Lungs - Symmetric chest expansion. Clear to auscultation bilaterally.   Skin - No rashes or ulcers. Skin warm and dry. No erythema on bilateral cheeks.  Extremities - No edema, cyanosis ,or clubbing  Neurological - Alert and oriented x 3,  grossly intact. No focal deficit.  Musculoskeletal -  Shoulders: Full ROM, without pain, no swelling, warmth or tenderness.  Elbows: Full ROM, without pain, no swelling, warmth or tenderness.  Wrists: mild left wrist tenderness and swelling.   MCP: No swelling, warmth or tenderness. Metacarpal squeeze negative  PIP: No swelling, warmth or tenderness.  DIP: No swelling, warmth or tenderness.  Hands : 5/5.    Sacroiliac joints: No local tenderness. MAU negative.   Hips: Full ROM.  No malalignment.  Knees:  Full ROM, without pain, no swelling, warmth or point tenderness.   Ankles: Full ROM, without pain, swelling, warmth or tenderness.  Toes:  Metatarsal squeeze negative  Cervical spine: No tenderness or limitation of movement  Lumbar spine: No tenderness or limitation of movement        Assessment/Plan   Jarret Carrasco is a 68 y.o. male with PMHx of CAD, gout, HTN, HLP, Allergic rhinitis, ?RA,  who is presenting today as a follow up for inflammatory arthritis    ##Inflammatory arthritis mainly in hands and left hand x-ray concerning for erosive changes. ESR/CRP/RF/CCP negative but he was on steroids so it may mask ESR/CRP. He has chronic gout but this inflammation in his hand is now different. He responded to steroids previously. On exam he has swelling of the left wrist. Repeat ESR/CRP  3/2024 also normal?.  We started prednisone taper and HCQ trial& he responded  well so we will  continue HCQ and he will need to start OCT monitoring. TB/hepatitis tests are negative 3/2024 in preparation for biologics but defer for now. May need an MRI of the Left wrist/hand in the future, defer for now. RTC 3 months    The assessment and plan, risk and benefits were discussed with the patient. All of the patients questions were answered and patient agrees to the plan.      Selam Carter MD  Clinical   Department of Rheumatology   [facility]

## 2024-04-04 ENCOUNTER — OFFICE VISIT (OUTPATIENT)
Dept: RHEUMATOLOGY | Facility: CLINIC | Age: 69
End: 2024-04-04
Payer: MEDICARE

## 2024-04-04 VITALS
TEMPERATURE: 97.2 F | WEIGHT: 201.8 LBS | BODY MASS INDEX: 27.33 KG/M2 | DIASTOLIC BLOOD PRESSURE: 86 MMHG | HEART RATE: 86 BPM | HEIGHT: 72 IN | SYSTOLIC BLOOD PRESSURE: 130 MMHG

## 2024-04-04 DIAGNOSIS — M19.90 INFLAMMATORY ARTHRITIS: ICD-10-CM

## 2024-04-04 DIAGNOSIS — Z79.899 HIGH RISK MEDICATION USE: Primary | ICD-10-CM

## 2024-04-04 DIAGNOSIS — M1A.9XX0 CHRONIC GOUT WITHOUT TOPHUS, UNSPECIFIED CAUSE, UNSPECIFIED SITE: ICD-10-CM

## 2024-04-04 DIAGNOSIS — Z79.899 LONG-TERM USE OF HYDROXYCHLOROQUINE: ICD-10-CM

## 2024-04-04 PROCEDURE — 99214 OFFICE O/P EST MOD 30 MIN: CPT | Performed by: STUDENT IN AN ORGANIZED HEALTH CARE EDUCATION/TRAINING PROGRAM

## 2024-04-04 PROCEDURE — 1159F MED LIST DOCD IN RCRD: CPT | Performed by: STUDENT IN AN ORGANIZED HEALTH CARE EDUCATION/TRAINING PROGRAM

## 2024-04-04 PROCEDURE — 1160F RVW MEDS BY RX/DR IN RCRD: CPT | Performed by: STUDENT IN AN ORGANIZED HEALTH CARE EDUCATION/TRAINING PROGRAM

## 2024-04-04 PROCEDURE — 3079F DIAST BP 80-89 MM HG: CPT | Performed by: STUDENT IN AN ORGANIZED HEALTH CARE EDUCATION/TRAINING PROGRAM

## 2024-04-04 PROCEDURE — 3075F SYST BP GE 130 - 139MM HG: CPT | Performed by: STUDENT IN AN ORGANIZED HEALTH CARE EDUCATION/TRAINING PROGRAM

## 2024-04-04 PROCEDURE — 1036F TOBACCO NON-USER: CPT | Performed by: STUDENT IN AN ORGANIZED HEALTH CARE EDUCATION/TRAINING PROGRAM

## 2024-04-04 RX ORDER — HYDROXYCHLOROQUINE SULFATE 200 MG/1
200 TABLET, FILM COATED ORAL 2 TIMES DAILY
Qty: 60 TABLET | Refills: 2 | Status: SHIPPED | OUTPATIENT
Start: 2024-04-04

## 2024-04-04 NOTE — PATIENT INSTRUCTIONS
Hydroxychloroquine counselling:  Educated the patient about the potential side effects of using antimalarials. Serious side effects are extremely rare. However, the fear of vision-threatening toxic retinopathy remains a major concern, this can be avoided when using the appropriate doses along with routine ocular monitoring.

## 2024-04-08 ENCOUNTER — APPOINTMENT (OUTPATIENT)
Dept: RHEUMATOLOGY | Facility: CLINIC | Age: 69
End: 2024-04-08
Payer: MEDICARE

## 2024-04-30 DIAGNOSIS — M10.042 ACUTE IDIOPATHIC GOUT OF LEFT HAND: ICD-10-CM

## 2024-04-30 DIAGNOSIS — I10 BENIGN ESSENTIAL HYPERTENSION: ICD-10-CM

## 2024-04-30 DIAGNOSIS — R73.03 PREDIABETES: ICD-10-CM

## 2024-04-30 DIAGNOSIS — J30.9 ALLERGIC RHINITIS, UNSPECIFIED SEASONALITY, UNSPECIFIED TRIGGER: ICD-10-CM

## 2024-04-30 DIAGNOSIS — Z12.5 SCREENING PSA (PROSTATE SPECIFIC ANTIGEN): Primary | ICD-10-CM

## 2024-04-30 DIAGNOSIS — E78.2 MIXED HYPERLIPIDEMIA: ICD-10-CM

## 2024-04-30 RX ORDER — ALLOPURINOL 100 MG/1
100 TABLET ORAL DAILY
Qty: 90 TABLET | Refills: 0 | Status: SHIPPED | OUTPATIENT
Start: 2024-04-30 | End: 2024-05-30 | Stop reason: SDUPTHER

## 2024-04-30 RX ORDER — MONTELUKAST SODIUM 10 MG/1
10 TABLET ORAL NIGHTLY
Qty: 90 TABLET | Refills: 0 | Status: SHIPPED | OUTPATIENT
Start: 2024-04-30 | End: 2024-05-30 | Stop reason: SDUPTHER

## 2024-04-30 RX ORDER — ROSUVASTATIN CALCIUM 10 MG/1
10 TABLET, COATED ORAL DAILY
Qty: 90 TABLET | Refills: 0 | Status: SHIPPED | OUTPATIENT
Start: 2024-04-30 | End: 2024-05-30 | Stop reason: SDUPTHER

## 2024-04-30 RX ORDER — LISINOPRIL 40 MG/1
40 TABLET ORAL DAILY
Qty: 90 TABLET | Refills: 0 | Status: SHIPPED | OUTPATIENT
Start: 2024-04-30 | End: 2024-05-30 | Stop reason: SDUPTHER

## 2024-04-30 NOTE — TELEPHONE ENCOUNTER
T/c to pt, spoke with pt, advised pt that Dr. Cardozo order lab to be done before the upcoming appointment 05/30/24, pt verbalized his understanding, advised pt that refills were sent to his pharmacy.

## 2024-05-29 ENCOUNTER — LAB (OUTPATIENT)
Dept: LAB | Facility: LAB | Age: 69
End: 2024-05-29
Payer: MEDICARE

## 2024-05-29 DIAGNOSIS — D64.9 ANEMIA, UNSPECIFIED TYPE: ICD-10-CM

## 2024-05-29 DIAGNOSIS — I10 BENIGN ESSENTIAL HYPERTENSION: ICD-10-CM

## 2024-05-29 DIAGNOSIS — Z12.5 SCREENING PSA (PROSTATE SPECIFIC ANTIGEN): ICD-10-CM

## 2024-05-29 DIAGNOSIS — E78.2 MIXED HYPERLIPIDEMIA: ICD-10-CM

## 2024-05-29 DIAGNOSIS — R73.03 PREDIABETES: ICD-10-CM

## 2024-05-29 LAB
BASOPHILS # BLD AUTO: 0.04 X10*3/UL (ref 0–0.1)
BASOPHILS NFR BLD AUTO: 0.7 %
EOSINOPHIL # BLD AUTO: 0.13 X10*3/UL (ref 0–0.7)
EOSINOPHIL NFR BLD AUTO: 2.4 %
ERYTHROCYTE [DISTWIDTH] IN BLOOD BY AUTOMATED COUNT: 11.5 % (ref 11.5–14.5)
EST. AVERAGE GLUCOSE BLD GHB EST-MCNC: 117 MG/DL
HBA1C MFR BLD: 5.7 %
HCT VFR BLD AUTO: 39.4 % (ref 41–52)
HGB BLD-MCNC: 12.9 G/DL (ref 13.5–17.5)
IMM GRANULOCYTES # BLD AUTO: 0.02 X10*3/UL (ref 0–0.7)
IMM GRANULOCYTES NFR BLD AUTO: 0.4 % (ref 0–0.9)
LYMPHOCYTES # BLD AUTO: 1.57 X10*3/UL (ref 1.2–4.8)
LYMPHOCYTES NFR BLD AUTO: 28.7 %
MCH RBC QN AUTO: 30.4 PG (ref 26–34)
MCHC RBC AUTO-ENTMCNC: 32.7 G/DL (ref 32–36)
MCV RBC AUTO: 93 FL (ref 80–100)
MONOCYTES # BLD AUTO: 0.54 X10*3/UL (ref 0.1–1)
MONOCYTES NFR BLD AUTO: 9.9 %
NEUTROPHILS # BLD AUTO: 3.17 X10*3/UL (ref 1.2–7.7)
NEUTROPHILS NFR BLD AUTO: 57.9 %
NRBC BLD-RTO: 0 /100 WBCS (ref 0–0)
PLATELET # BLD AUTO: 190 X10*3/UL (ref 150–450)
RBC # BLD AUTO: 4.24 X10*6/UL (ref 4.5–5.9)
WBC # BLD AUTO: 5.5 X10*3/UL (ref 4.4–11.3)

## 2024-05-29 PROCEDURE — 82746 ASSAY OF FOLIC ACID SERUM: CPT

## 2024-05-29 PROCEDURE — 82607 VITAMIN B-12: CPT

## 2024-05-29 PROCEDURE — G0103 PSA SCREENING: HCPCS

## 2024-05-29 PROCEDURE — 83036 HEMOGLOBIN GLYCOSYLATED A1C: CPT

## 2024-05-29 PROCEDURE — 80061 LIPID PANEL: CPT

## 2024-05-29 PROCEDURE — 80053 COMPREHEN METABOLIC PANEL: CPT

## 2024-05-29 PROCEDURE — 83540 ASSAY OF IRON: CPT

## 2024-05-29 PROCEDURE — 83550 IRON BINDING TEST: CPT

## 2024-05-29 PROCEDURE — 85025 COMPLETE CBC W/AUTO DIFF WBC: CPT

## 2024-05-29 PROCEDURE — 82728 ASSAY OF FERRITIN: CPT

## 2024-05-29 PROCEDURE — 36415 COLL VENOUS BLD VENIPUNCTURE: CPT

## 2024-05-30 ENCOUNTER — OFFICE VISIT (OUTPATIENT)
Dept: PRIMARY CARE | Facility: CLINIC | Age: 69
End: 2024-05-30
Payer: MEDICARE

## 2024-05-30 VITALS
HEIGHT: 72 IN | BODY MASS INDEX: 26.6 KG/M2 | WEIGHT: 196.4 LBS | HEART RATE: 76 BPM | SYSTOLIC BLOOD PRESSURE: 126 MMHG | DIASTOLIC BLOOD PRESSURE: 74 MMHG | TEMPERATURE: 97.4 F

## 2024-05-30 DIAGNOSIS — I10 BENIGN ESSENTIAL HYPERTENSION: Primary | ICD-10-CM

## 2024-05-30 DIAGNOSIS — J30.9 ALLERGIC RHINITIS, UNSPECIFIED SEASONALITY, UNSPECIFIED TRIGGER: ICD-10-CM

## 2024-05-30 DIAGNOSIS — M06.9 ACUTE RHEUMATOID ARTHRITIS (MULTI): ICD-10-CM

## 2024-05-30 DIAGNOSIS — M10.042 ACUTE IDIOPATHIC GOUT OF LEFT HAND: ICD-10-CM

## 2024-05-30 DIAGNOSIS — D64.9 ANEMIA, UNSPECIFIED TYPE: ICD-10-CM

## 2024-05-30 DIAGNOSIS — E78.5 HYPERLIPIDEMIA, UNSPECIFIED HYPERLIPIDEMIA TYPE: ICD-10-CM

## 2024-05-30 DIAGNOSIS — G57.02 PIRIFORMIS SYNDROME, LEFT: ICD-10-CM

## 2024-05-30 DIAGNOSIS — N18.31 STAGE 3A CHRONIC KIDNEY DISEASE (MULTI): ICD-10-CM

## 2024-05-30 DIAGNOSIS — E78.2 MIXED HYPERLIPIDEMIA: ICD-10-CM

## 2024-05-30 PROBLEM — D64.89 OTHER SPECIFIED ANEMIAS: Status: ACTIVE | Noted: 2024-05-30

## 2024-05-30 LAB
ALBUMIN SERPL BCP-MCNC: 4.3 G/DL (ref 3.4–5)
ALP SERPL-CCNC: 54 U/L (ref 33–136)
ALT SERPL W P-5'-P-CCNC: 23 U/L (ref 10–52)
ANION GAP SERPL CALC-SCNC: 15 MMOL/L (ref 10–20)
AST SERPL W P-5'-P-CCNC: 17 U/L (ref 9–39)
BILIRUB SERPL-MCNC: 0.5 MG/DL (ref 0–1.2)
BUN SERPL-MCNC: 27 MG/DL (ref 6–23)
CALCIUM SERPL-MCNC: 10 MG/DL (ref 8.6–10.6)
CHLORIDE SERPL-SCNC: 104 MMOL/L (ref 98–107)
CHOLEST SERPL-MCNC: 153 MG/DL (ref 0–199)
CHOLESTEROL/HDL RATIO: 2.5
CO2 SERPL-SCNC: 27 MMOL/L (ref 21–32)
CREAT SERPL-MCNC: 1.46 MG/DL (ref 0.5–1.3)
EGFRCR SERPLBLD CKD-EPI 2021: 51 ML/MIN/1.73M*2
FERRITIN SERPL-MCNC: 144 NG/ML (ref 20–300)
FOLATE SERPL-MCNC: 14.7 NG/ML
GLUCOSE SERPL-MCNC: 95 MG/DL (ref 74–99)
HDLC SERPL-MCNC: 60.4 MG/DL
IRON SATN MFR SERPL: 21 % (ref 25–45)
IRON SERPL-MCNC: 72 UG/DL (ref 35–150)
LDLC SERPL CALC-MCNC: 63 MG/DL
NON HDL CHOLESTEROL: 93 MG/DL (ref 0–149)
POTASSIUM SERPL-SCNC: 4.7 MMOL/L (ref 3.5–5.3)
PROT SERPL-MCNC: 6.4 G/DL (ref 6.4–8.2)
PSA SERPL-MCNC: 2.97 NG/ML
SODIUM SERPL-SCNC: 141 MMOL/L (ref 136–145)
TIBC SERPL-MCNC: 338 UG/DL (ref 240–445)
TRIGL SERPL-MCNC: 148 MG/DL (ref 0–149)
UIBC SERPL-MCNC: 266 UG/DL (ref 110–370)
VIT B12 SERPL-MCNC: 316 PG/ML (ref 211–911)
VLDL: 30 MG/DL (ref 0–40)

## 2024-05-30 PROCEDURE — 3078F DIAST BP <80 MM HG: CPT | Performed by: FAMILY MEDICINE

## 2024-05-30 PROCEDURE — 1160F RVW MEDS BY RX/DR IN RCRD: CPT | Performed by: FAMILY MEDICINE

## 2024-05-30 PROCEDURE — 1159F MED LIST DOCD IN RCRD: CPT | Performed by: FAMILY MEDICINE

## 2024-05-30 PROCEDURE — 99214 OFFICE O/P EST MOD 30 MIN: CPT | Performed by: FAMILY MEDICINE

## 2024-05-30 PROCEDURE — 3074F SYST BP LT 130 MM HG: CPT | Performed by: FAMILY MEDICINE

## 2024-05-30 RX ORDER — LISINOPRIL 40 MG/1
40 TABLET ORAL DAILY
Qty: 90 TABLET | Refills: 1 | Status: SHIPPED | OUTPATIENT
Start: 2024-05-30

## 2024-05-30 RX ORDER — ROSUVASTATIN CALCIUM 10 MG/1
10 TABLET, COATED ORAL DAILY
Qty: 90 TABLET | Refills: 1 | Status: SHIPPED | OUTPATIENT
Start: 2024-05-30

## 2024-05-30 RX ORDER — ALLOPURINOL 100 MG/1
100 TABLET ORAL DAILY
Qty: 90 TABLET | Refills: 1 | Status: SHIPPED | OUTPATIENT
Start: 2024-05-30

## 2024-05-30 RX ORDER — MONTELUKAST SODIUM 10 MG/1
10 TABLET ORAL NIGHTLY
Qty: 90 TABLET | Refills: 1 | Status: SHIPPED | OUTPATIENT
Start: 2024-05-30

## 2024-05-30 RX ORDER — FLUTICASONE PROPIONATE 50 MCG
1 SPRAY, SUSPENSION (ML) NASAL DAILY
Qty: 16 G | Refills: 3 | Status: SHIPPED | OUTPATIENT
Start: 2024-05-30

## 2024-05-30 NOTE — PROGRESS NOTES
Subjective   Patient ID: Jarret Carrasco is a 68 y.o. male who presents for Follow-up (3 month follow-up, medication refills).    HPI   Here for follow-up  L buttock/low back pain-symptoms in past 1 month, pain when laying flat, better if moving, intermittently will have pain radiating down posterior left leg.  [id number].  Doing well on current regimen of lisinopril 40 mg daily.  Hyperlipidemia-stable on rosuvastatin.  Denies chest pains, palpitations, dyspnea on exertion  Inflammatory arthritis-following with rheumatology.  Symptoms improving on Plaquenil.  Ports eye exam complete.  Review of Systems   Constitutional:  Negative for fatigue and fever.   Respiratory:  Negative for cough, chest tightness and shortness of breath.    Cardiovascular:  Negative for chest pain and palpitations.   Gastrointestinal:  Negative for constipation and diarrhea.   Genitourinary:  Negative for difficulty urinating.   Musculoskeletal:  Positive for back pain.   Neurological:  Negative for numbness and headaches.       Objective   /84 (BP Location: Left arm, Patient Position: Sitting, BP Cuff Size: Large adult)   Pulse 76   Temp 36.3 °C (97.4 °F)   Ht 1.829 m (6')   Wt 89.1 kg (196 lb 6.4 oz)   BMI 26.64 kg/m²     Physical Exam  Vitals reviewed.   Constitutional:       General: He is not in acute distress.     Appearance: Normal appearance.   HENT:      Head: Normocephalic and atraumatic.      Right Ear: Tympanic membrane and ear canal normal.      Left Ear: Tympanic membrane and ear canal normal.      Nose: Nose normal. No congestion or rhinorrhea.      Mouth/Throat:      Mouth: Mucous membranes are moist.      Pharynx: Oropharynx is clear.   Eyes:      Extraocular Movements: Extraocular movements intact.      Conjunctiva/sclera: Conjunctivae normal.      Pupils: Pupils are equal, round, and reactive to light.   Cardiovascular:      Rate and Rhythm: Normal rate and regular rhythm.      Pulses: Normal pulses.       Heart sounds: Normal heart sounds. No murmur heard.  Pulmonary:      Effort: Pulmonary effort is normal. No respiratory distress.      Breath sounds: Normal breath sounds.   Abdominal:      General: Abdomen is flat. Bowel sounds are normal.      Palpations: Abdomen is soft.      Tenderness: There is no abdominal tenderness.   Musculoskeletal:         General: Normal range of motion.      Cervical back: Normal range of motion and neck supple.      Lumbar back: Negative right straight leg raise test and negative left straight leg raise test.      Comments: Pain with palpation over left piriformis muscle.   Lymphadenopathy:      Cervical: No cervical adenopathy.   Skin:     General: Skin is warm and dry.   Neurological:      General: No focal deficit present.      Mental Status: He is alert and oriented to person, place, and time.   Psychiatric:         Mood and Affect: Mood normal.         Thought Content: Thought content normal.       Lab Results   Component Value Date    WBC 5.5 05/29/2024    HGB 12.9 (L) 05/29/2024    HCT 39.4 (L) 05/29/2024     05/29/2024    CHOL 153 05/29/2024    TRIG 148 05/29/2024    HDL 60.4 05/29/2024    ALT 23 05/29/2024    AST 17 05/29/2024     05/29/2024    K 4.7 05/29/2024     05/29/2024    CREATININE 1.46 (H) 05/29/2024    BUN 27 (H) 05/29/2024    CO2 27 05/29/2024    PSA 2.20 02/11/2021    INR 0.9 02/16/2022    HGBA1C 5.7 (H) 05/29/2024     par      Assessment/Plan   Diagnoses and all orders for this visit:  Benign essential hypertension  Continue current dose of lisinopril  -     lisinopril 40 mg tablet; Take 1 tablet (40 mg) by mouth once daily. as directed  Piriformis syndrome, left-discussed stretching exercises.  If symptoms persist, consider PT referral  Stage 3a chronic kidney disease (Multi)-stable, will continue to follow.  May be related to significant NSAID use over past 6 months which is reduced at present.  Hyperlipidemia, unspecified hyperlipidemia  type  Acute rheumatoid arthritis (Multi)  Acute idiopathic gout of left hand  Continue allopurinol  -     allopurinol (Zyloprim) 100 mg tablet; Take 1 tablet (100 mg) by mouth once daily.  Allergic rhinitis, unspecified seasonality, unspecified trigger  -     fluticasone (Flonase) 50 mcg/actuation nasal spray; Administer 1 spray into each nostril once daily.  -     montelukast (Singulair) 10 mg tablet; Take 1 tablet (10 mg) by mouth once daily at bedtime.  Mixed hyperlipidemia  -     rosuvastatin (Crestor) 10 mg tablet; Take 1 tablet (10 mg) by mouth once daily.  Anemia, unspecified type  Recent lab work notable for mild anemia.  May be related to initiation of Plaquenil.  Check iron studies, B12, folate to rule out nutritional deficiency.  Has follow-up CBC with rheumatology for next month.  -     Iron and TIBC; Future  -     Vitamin B12; Future  -     Folate; Future  -     Ferritin; Future  -     fluticasone (Flonase) 50 mcg/actuation nasal spray; Administer 1 spray into each nostril once daily.  Other orders  -     Follow Up In Primary Care - Established; Future  -     Follow Up In Primary Care - Medicare Annual; Future

## 2024-05-31 ASSESSMENT — ENCOUNTER SYMPTOMS
BACK PAIN: 1
PALPITATIONS: 0
CHEST TIGHTNESS: 0
FATIGUE: 0
DIFFICULTY URINATING: 0
COUGH: 0
HEADACHES: 0
CONSTIPATION: 0
NUMBNESS: 0
DIARRHEA: 0
FEVER: 0
SHORTNESS OF BREATH: 0

## 2024-06-05 ENCOUNTER — TELEPHONE (OUTPATIENT)
Dept: PRIMARY CARE | Facility: CLINIC | Age: 69
End: 2024-06-05
Payer: MEDICARE

## 2024-06-05 NOTE — TELEPHONE ENCOUNTER
No evidence of iron or folate deficiency.  B12 level is low normal. Consider adding b12 100 mcg daily.  Proceed with repeat CBC next month as ordered by your rheumatologist.    Patient aware and verbalized understanding

## 2024-06-24 ENCOUNTER — LAB (OUTPATIENT)
Dept: LAB | Facility: LAB | Age: 69
End: 2024-06-24
Payer: MEDICARE

## 2024-06-24 DIAGNOSIS — I10 BENIGN ESSENTIAL HYPERTENSION: ICD-10-CM

## 2024-06-24 DIAGNOSIS — Z79.899 HIGH RISK MEDICATION USE: ICD-10-CM

## 2024-06-24 DIAGNOSIS — M19.90 INFLAMMATORY ARTHRITIS: ICD-10-CM

## 2024-06-24 DIAGNOSIS — Z79.899 LONG-TERM USE OF HYDROXYCHLOROQUINE: ICD-10-CM

## 2024-06-24 LAB
ALBUMIN SERPL BCP-MCNC: 4.6 G/DL (ref 3.4–5)
ALP SERPL-CCNC: 57 U/L (ref 33–136)
ALT SERPL W P-5'-P-CCNC: 30 U/L (ref 10–52)
ANION GAP SERPL CALC-SCNC: 12 MMOL/L (ref 10–20)
AST SERPL W P-5'-P-CCNC: 21 U/L (ref 9–39)
BASOPHILS # BLD AUTO: 0.04 X10*3/UL (ref 0–0.1)
BASOPHILS NFR BLD AUTO: 0.9 %
BILIRUB SERPL-MCNC: 0.5 MG/DL (ref 0–1.2)
BUN SERPL-MCNC: 25 MG/DL (ref 6–23)
CALCIUM SERPL-MCNC: 9.8 MG/DL (ref 8.6–10.6)
CHLORIDE SERPL-SCNC: 106 MMOL/L (ref 98–107)
CO2 SERPL-SCNC: 27 MMOL/L (ref 21–32)
CREAT SERPL-MCNC: 1.37 MG/DL (ref 0.5–1.3)
CREAT UR-MCNC: 53.7 MG/DL (ref 20–370)
CRP SERPL-MCNC: 0.26 MG/DL
EGFRCR SERPLBLD CKD-EPI 2021: 55 ML/MIN/1.73M*2
EOSINOPHIL # BLD AUTO: 0.13 X10*3/UL (ref 0–0.7)
EOSINOPHIL NFR BLD AUTO: 2.8 %
ERYTHROCYTE [DISTWIDTH] IN BLOOD BY AUTOMATED COUNT: 11.8 % (ref 11.5–14.5)
ERYTHROCYTE [SEDIMENTATION RATE] IN BLOOD BY WESTERGREN METHOD: 3 MM/H (ref 0–20)
GLUCOSE SERPL-MCNC: 81 MG/DL (ref 74–99)
HCT VFR BLD AUTO: 39.5 % (ref 41–52)
HGB BLD-MCNC: 13.1 G/DL (ref 13.5–17.5)
IMM GRANULOCYTES # BLD AUTO: 0.01 X10*3/UL (ref 0–0.7)
IMM GRANULOCYTES NFR BLD AUTO: 0.2 % (ref 0–0.9)
LYMPHOCYTES # BLD AUTO: 1.56 X10*3/UL (ref 1.2–4.8)
LYMPHOCYTES NFR BLD AUTO: 34.1 %
MCH RBC QN AUTO: 31.6 PG (ref 26–34)
MCHC RBC AUTO-ENTMCNC: 33.2 G/DL (ref 32–36)
MCV RBC AUTO: 95 FL (ref 80–100)
MICROALBUMIN UR-MCNC: <7 MG/L
MICROALBUMIN/CREAT UR: NORMAL MG/G{CREAT}
MONOCYTES # BLD AUTO: 0.52 X10*3/UL (ref 0.1–1)
MONOCYTES NFR BLD AUTO: 11.4 %
NEUTROPHILS # BLD AUTO: 2.32 X10*3/UL (ref 1.2–7.7)
NEUTROPHILS NFR BLD AUTO: 50.6 %
NRBC BLD-RTO: 0 /100 WBCS (ref 0–0)
PLATELET # BLD AUTO: 186 X10*3/UL (ref 150–450)
POTASSIUM SERPL-SCNC: 5 MMOL/L (ref 3.5–5.3)
PROT SERPL-MCNC: 6.7 G/DL (ref 6.4–8.2)
RBC # BLD AUTO: 4.15 X10*6/UL (ref 4.5–5.9)
SODIUM SERPL-SCNC: 140 MMOL/L (ref 136–145)
WBC # BLD AUTO: 4.6 X10*3/UL (ref 4.4–11.3)

## 2024-06-24 PROCEDURE — 80053 COMPREHEN METABOLIC PANEL: CPT

## 2024-06-24 PROCEDURE — 85652 RBC SED RATE AUTOMATED: CPT

## 2024-06-24 PROCEDURE — 85025 COMPLETE CBC W/AUTO DIFF WBC: CPT

## 2024-06-24 PROCEDURE — 82043 UR ALBUMIN QUANTITATIVE: CPT

## 2024-06-24 PROCEDURE — 86140 C-REACTIVE PROTEIN: CPT

## 2024-06-24 PROCEDURE — 36415 COLL VENOUS BLD VENIPUNCTURE: CPT

## 2024-06-24 PROCEDURE — 82570 ASSAY OF URINE CREATININE: CPT

## 2024-06-25 NOTE — PROGRESS NOTES
Rheumatology Outpatient Follow up Note    Subjective   Jarret Carrasco is a 68 y.o. male presenting today for Arthritis.    History of Presenting Problem:   Rheum history:   He was diagnosed with gout many years ago. It usually affects his big toes, ankles and sometimes the hands. He is on allopurinol 100 mg daily, colchicine 0.6 mg as needed. No kidney stones, no skin tophi. Alcohol usually triggers that.     For the past few months he started noticing stiffness in both hands and puffiness. Since 1/2024 he starting having left wrist pain and swelling that extended to his whole hand. He was seen by ortho and had x-rays and told him it could be RA.  He was given a medrol ailyn which didn't help then he was given 3 weeks of prednisone which seemed to help.     Now he continues to have pain and stiffness in his left hand/wrist>right. He also has pain in his left big toe.     No eye inflammation  No GI inflammation  No enthesitis  No psoriasis  No sicca symptoms  No neuropathy  No ILD  No Raynaud's  No blood clots  No diverticulitis      Interval history:   His hand pain/swelling and stiffness has improved on HCQ. No new symptoms. No skin rashes      Past Medical History:   Past Medical History:   Diagnosis Date    Abnormal results of kidney function studies     Decreased GFR    Encounter for other preprocedural examination 02/21/2022    Preoperative clearance    Impacted cerumen, bilateral     Impacted cerumen, bilateral    Laceration without foreign body of unspecified hand, initial encounter     Hand laceration    Personal history of other diseases of the nervous system and sense organs     History of acute otitis media    Personal history of other diseases of the respiratory system 05/29/2017    History of acute bronchitis    Sebaceous cyst     Infected sebaceous cyst       Allergies: No Known Allergies    Medications:   Current Outpatient Medications:     allopurinol (Zyloprim) 100 mg tablet, Take 1 tablet (100 mg)  by mouth once daily., Disp: 90 tablet, Rfl: 1    colchicine 0.6 mg tablet, 1.2 mg po x1, then 0.6 mg po in 1 hours. Max 3 tabs per acute attack. Do not repeat for 72 hours., Disp: 12 tablet, Rfl: 1    fluticasone (Flonase) 50 mcg/actuation nasal spray, Administer 1 spray into each nostril once daily., Disp: 16 g, Rfl: 3    hydroxychloroquine (Plaquenil) 200 mg tablet, Take 1 tablet (200 mg) by mouth 2 times a day., Disp: 60 tablet, Rfl: 2    lisinopril 40 mg tablet, Take 1 tablet (40 mg) by mouth once daily. as directed, Disp: 90 tablet, Rfl: 1    montelukast (Singulair) 10 mg tablet, Take 1 tablet (10 mg) by mouth once daily at bedtime., Disp: 90 tablet, Rfl: 1    predniSONE (Deltasone) 10 mg tablet, 4tabs daily for 4 days then 3 tabs daily for 4days then 2 tabs daily for next 10 days before further to decide (Patient not taking: Reported on 5/30/2024), Disp: 48 tablet, Rfl: 0    rosuvastatin (Crestor) 10 mg tablet, Take 1 tablet (10 mg) by mouth once daily., Disp: 90 tablet, Rfl: 1    tadalafil (Cialis) 10 mg tablet, Take 1 tablet (10 mg) by mouth once daily as needed., Disp: , Rfl:     Review of Systems:   Constitutional: Denies fever, chills   Eyes: Denies dry eyes, pain in the eyes   ENT: Denies dry mouth, loss of taste, sores in the mouth  Cardiovascular: Denies chest pain, palpitations   Respiratory: Denies shortness of breath   Gastrointestinal: Denies heartburn   Integumentary: Denies photosensitivity, rash or lesions, Raynaud's   Neurological: Denies any numbness or tingling    MSK: As per HPI.     All 10 review of systems have been reviewed and are negative for complaint except as noted in the HPI    Objective   Physical Examination:  Vitals:    06/27/24 0918   BP: 111/74   Pulse: 79   Temp: 36.7 °C (98.1 °F)         Growth %ile SmartLinks can only be used for patients less than 20 years old.  Ht Readings from Last 1 Encounters:   06/27/24 1.829 m (6')     Wt Readings from Last 1 Encounters:   06/27/24  88.4 kg (194 lb 12.8 oz)       General - NAD, sitting up in chair, well-groomed, pleasant, AAOx3  Head: Normocephalic, atraumatic  Eyes - PERRLA, EOMI. No conjunctiva injection.   \\  Cardiovascular - Normal S1, S2. Regular rate and rhythm. No murmurs or rubs.  Lungs - Symmetric chest expansion. Clear to auscultation bilaterally.   Skin - No rashes or ulcers. Skin warm and dry. No erythema on bilateral cheeks.  Extremities - No edema, cyanosis ,or clubbing  Neurological - Alert and oriented x 3,  grossly intact. No focal deficit.  Musculoskeletal -  Shoulders: Full ROM, without pain, no swelling, warmth or tenderness.  Elbows: Full ROM, without pain, no swelling, warmth or tenderness.  Wrists: mild left wrist tenderness and swelling.   MCP: No swelling, warmth or tenderness. Metacarpal squeeze negative  PIP: No swelling, warmth or tenderness.  DIP: No swelling, warmth or tenderness.  Hands : 5/5.    Sacroiliac joints: No local tenderness. MAU negative.   Hips: Full ROM.  No malalignment.  Knees:  Full ROM, without pain, no swelling, warmth or point tenderness.   Ankles: Full ROM, without pain, swelling, warmth or tenderness.  Toes:  Metatarsal squeeze negative  Cervical spine: No tenderness or limitation of movement  Lumbar spine: No tenderness or limitation of movement        Assessment/Plan   Jarret Carrasco is a 68 y.o. male with PMHx of CAD, gout, HTN, HLP, Allergic rhinitis, ?RA,  who is presenting today as a follow up for inflammatory arthritis    ##Inflammatory arthritis:  - mainly in hands and left hand x-ray concerning for erosive changes.   -ESR/CRP/RF/CCP negative but he was on steroids so it may mask ESR/CRP. . He responded to steroids previously. On exam he has swelling of the left wrist. Repeat ESR/CRP  3/2024 also normal?.   - We started prednisone taper and HCQ trial& he responded  well so we will continue HCQ   - TB/hepatitis tests are negative 3/2024 in preparation for biologics but defer  for now.  -May need an MRI of the Left wrist/hand in the future, defer for now.  -OT referral  -Reviewed labs 6/2024: ESR/CRP normal, CBC/CMP at baseline    ##HCQ long term use:  -Educated the patient about the potential side effects of using antimalarials. Serious side effects are extremely rare. However, the fear of vision-threatening toxic retinopathy remains a major concern, this can be avoided when using the appropriate doses along with routine ocular monitoring.  -Current dose is 200 mg BID (<5mg/kg/day)  -Last eye exam 5/2024: normal OCT baseline      ##Chronic gout:  -No recent flares  -Continue Allopurinol 100 mg daily  -Last sUA is 6.1 in 3/2024      The assessment and plan, risk and benefits were discussed with the patient. All of the patients questions were answered and patient agrees to the plan.      Selam Carter MD  Clinical   Department of Rheumatology   [facility]

## 2024-06-27 ENCOUNTER — APPOINTMENT (OUTPATIENT)
Dept: RHEUMATOLOGY | Facility: CLINIC | Age: 69
End: 2024-06-27
Payer: MEDICARE

## 2024-06-27 VITALS
BODY MASS INDEX: 26.38 KG/M2 | SYSTOLIC BLOOD PRESSURE: 111 MMHG | WEIGHT: 194.8 LBS | DIASTOLIC BLOOD PRESSURE: 74 MMHG | TEMPERATURE: 98.1 F | HEIGHT: 72 IN | HEART RATE: 79 BPM

## 2024-06-27 DIAGNOSIS — M19.90 INFLAMMATORY ARTHRITIS: Primary | ICD-10-CM

## 2024-06-27 DIAGNOSIS — M1A.9XX0 CHRONIC GOUT WITHOUT TOPHUS, UNSPECIFIED CAUSE, UNSPECIFIED SITE: ICD-10-CM

## 2024-06-27 DIAGNOSIS — Z79.899 LONG-TERM USE OF HYDROXYCHLOROQUINE: ICD-10-CM

## 2024-06-27 DIAGNOSIS — Z79.899 HIGH RISK MEDICATION USE: ICD-10-CM

## 2024-06-27 PROCEDURE — 99214 OFFICE O/P EST MOD 30 MIN: CPT | Performed by: STUDENT IN AN ORGANIZED HEALTH CARE EDUCATION/TRAINING PROGRAM

## 2024-06-27 PROCEDURE — 1036F TOBACCO NON-USER: CPT | Performed by: STUDENT IN AN ORGANIZED HEALTH CARE EDUCATION/TRAINING PROGRAM

## 2024-06-27 PROCEDURE — 1159F MED LIST DOCD IN RCRD: CPT | Performed by: STUDENT IN AN ORGANIZED HEALTH CARE EDUCATION/TRAINING PROGRAM

## 2024-06-27 PROCEDURE — 3074F SYST BP LT 130 MM HG: CPT | Performed by: STUDENT IN AN ORGANIZED HEALTH CARE EDUCATION/TRAINING PROGRAM

## 2024-06-27 PROCEDURE — 3078F DIAST BP <80 MM HG: CPT | Performed by: STUDENT IN AN ORGANIZED HEALTH CARE EDUCATION/TRAINING PROGRAM

## 2024-06-27 PROCEDURE — 1160F RVW MEDS BY RX/DR IN RCRD: CPT | Performed by: STUDENT IN AN ORGANIZED HEALTH CARE EDUCATION/TRAINING PROGRAM

## 2024-06-27 RX ORDER — METHYLPREDNISOLONE 4 MG/1
TABLET ORAL
Qty: 1 EACH | Refills: 0 | Status: SHIPPED | OUTPATIENT
Start: 2024-06-27

## 2024-06-27 RX ORDER — HYDROXYCHLOROQUINE SULFATE 200 MG/1
200 TABLET, FILM COATED ORAL 2 TIMES DAILY
Qty: 60 TABLET | Refills: 2 | Status: SHIPPED | OUTPATIENT
Start: 2024-06-27

## 2024-09-10 NOTE — PROGRESS NOTES
Rheumatology Outpatient Follow up Note    Subjective   Jarret Carrasco is a 68 y.o. male presenting today for Gout and Arthritis.    History of Presenting Problem:   Rheum history:   He was diagnosed with gout many years ago. It usually affects his big toes, ankles and sometimes the hands. He is on allopurinol 100 mg daily, colchicine 0.6 mg as needed. No kidney stones, no skin tophi. Alcohol usually triggers that.     For the past few months he started noticing stiffness in both hands and puffiness. Since 1/2024 he starting having left wrist pain and swelling that extended to his whole hand. He was seen by ortho and had x-rays and told him it could be RA.  He was given a medrol ailyn which didn't help then he was given 3 weeks of prednisone which seemed to help.     Now he continues to have pain and stiffness in his left hand/wrist>right. He also has pain in his left big toe.     No eye inflammation  No GI inflammation  No enthesitis  No psoriasis  No sicca symptoms  No neuropathy  No ILD  No Raynaud's  No blood clots  No diverticulitis      Interval history:   His hand pain/swelling and stiffness has improved on HCQ. No new symptoms. No skin rashes. No gout attacks      Past Medical History:   Past Medical History:   Diagnosis Date    Abnormal results of kidney function studies     Decreased GFR    Encounter for other preprocedural examination 02/21/2022    Preoperative clearance    Impacted cerumen, bilateral     Impacted cerumen, bilateral    Laceration without foreign body of unspecified hand, initial encounter     Hand laceration    Personal history of other diseases of the nervous system and sense organs     History of acute otitis media    Personal history of other diseases of the respiratory system 05/29/2017    History of acute bronchitis    Sebaceous cyst     Infected sebaceous cyst       Allergies: No Known Allergies    Medications:   Current Outpatient Medications:     allopurinol (Zyloprim) 100 mg  tablet, Take 1 tablet (100 mg) by mouth once daily., Disp: 90 tablet, Rfl: 1    colchicine 0.6 mg tablet, 1.2 mg po x1, then 0.6 mg po in 1 hours. Max 3 tabs per acute attack. Do not repeat for 72 hours., Disp: 12 tablet, Rfl: 1    fluticasone (Flonase) 50 mcg/actuation nasal spray, Administer 1 spray into each nostril once daily., Disp: 16 g, Rfl: 3    hydroxychloroquine (Plaquenil) 200 mg tablet, Take 1 tablet (200 mg) by mouth 2 times a day., Disp: 60 tablet, Rfl: 2    lisinopril 40 mg tablet, Take 1 tablet (40 mg) by mouth once daily. as directed, Disp: 90 tablet, Rfl: 1    methylPREDNISolone (Medrol, Héctor,) 4 mg tablets, Follow schedule on package instructions, Disp: 1 each, Rfl: 0    montelukast (Singulair) 10 mg tablet, Take 1 tablet (10 mg) by mouth once daily at bedtime., Disp: 90 tablet, Rfl: 1    rosuvastatin (Crestor) 10 mg tablet, Take 1 tablet (10 mg) by mouth once daily., Disp: 90 tablet, Rfl: 1    predniSONE (Deltasone) 10 mg tablet, 4tabs daily for 4 days then 3 tabs daily for 4days then 2 tabs daily for next 10 days before further to decide (Patient not taking: Reported on 5/30/2024), Disp: 48 tablet, Rfl: 0    tadalafil (Cialis) 10 mg tablet, Take 1 tablet (10 mg) by mouth once daily as needed., Disp: , Rfl:     Review of Systems:   Constitutional: Denies fever, chills   Eyes: Denies dry eyes, pain in the eyes   ENT: Denies dry mouth, loss of taste, sores in the mouth  Cardiovascular: Denies chest pain, palpitations   Respiratory: Denies shortness of breath   Gastrointestinal: Denies heartburn   Integumentary: Denies photosensitivity, rash or lesions, Raynaud's   Neurological: Denies any numbness or tingling    MSK: As per HPI.     All 10 review of systems have been reviewed and are negative for complaint except as noted in the HPI    Objective   Physical Examination:  Vitals:    09/11/24 1502   BP: 139/81   Pulse: 80   Temp: 36.5 °C (97.7 °F)           Growth %ile SmartLinks can only be used for  patients less than 20 years old.  Ht Readings from Last 1 Encounters:   09/11/24 1.829 m (6')     Wt Readings from Last 1 Encounters:   09/11/24 87.8 kg (193 lb 9.6 oz)       General - NAD, sitting up in chair, well-groomed, pleasant, AAOx3  Head: Normocephalic, atraumatic  Eyes - PERRLA, EOMI. No conjunctiva injection.   \\  Cardiovascular - Normal S1, S2. Regular rate and rhythm. No murmurs or rubs.  Lungs - Symmetric chest expansion. Clear to auscultation bilaterally.   Skin - No rashes or ulcers. Skin warm and dry. No erythema on bilateral cheeks.  Extremities - No edema, cyanosis ,or clubbing  Neurological - Alert and oriented x 3,  grossly intact. No focal deficit.  Musculoskeletal -  Shoulders: Full ROM, without pain, no swelling, warmth or tenderness.  Elbows: Full ROM, without pain, no swelling, warmth or tenderness.  Wrists: mild left wrist tenderness and swelling.   MCP: No swelling, warmth or tenderness. Metacarpal squeeze negative  PIP: No swelling, warmth or tenderness.  DIP: No swelling, warmth or tenderness.  Hands : 5/5.    Sacroiliac joints: No local tenderness. MAU negative.   Hips: Full ROM.  No malalignment.  Knees:  Full ROM, without pain, no swelling, warmth or point tenderness.   Ankles: Full ROM, without pain, swelling, warmth or tenderness.  Toes:  Metatarsal squeeze negative  Cervical spine: No tenderness or limitation of movement  Lumbar spine: No tenderness or limitation of movement        Assessment/Plan   Jarret Carrasco is a 68 y.o. male with PMHx of CAD, gout, HTN, HLP, Allergic rhinitis, ?RA,  who is presenting today as a follow up for inflammatory arthritis    ##Inflammatory arthritis:  - mainly in hands and left hand x-ray concerning for erosive changes.   -ESR/CRP/RF/CCP negative but he was on steroids so it may mask ESR/CRP. . He responded to steroids previously. On exam he has swelling of the left wrist. Repeat ESR/CRP  3/2024 also normal?.   -We started prednisone  taper and HCQ trial& he responded  well so we will continue HCQ   -HB/hepatitis tests are negative 3/2024 in preparation for biologics but defer for now.  -Reviewed labs 6/2024: ESR/CRP normal, CBC/CMP at baseline    ##HCQ long term use:  -Educated the patient about the potential side effects of using antimalarials. Serious side effects are extremely rare. However, the fear of vision-threatening toxic retinopathy remains a major concern, this can be avoided when using the appropriate doses along with routine ocular monitoring.  -Current dose is 200 mg BID (<5mg/kg/day)  -Last eye exam 5/2024: normal OCT baseline      ##Chronic gout:  -No recent flares  -Continue Allopurinol 100 mg daily  -Last sUA is 6.1 in 3/2024      The assessment and plan, risk and benefits were discussed with the patient. All of the patients questions were answered and patient agrees to the plan.      Selam Carter MD  Clinical   Department of Rheumatology   [facility]

## 2024-09-11 ENCOUNTER — APPOINTMENT (OUTPATIENT)
Dept: RHEUMATOLOGY | Facility: CLINIC | Age: 69
End: 2024-09-11
Payer: MEDICARE

## 2024-09-11 VITALS
SYSTOLIC BLOOD PRESSURE: 139 MMHG | HEART RATE: 80 BPM | WEIGHT: 193.6 LBS | HEIGHT: 72 IN | BODY MASS INDEX: 26.22 KG/M2 | DIASTOLIC BLOOD PRESSURE: 81 MMHG | TEMPERATURE: 97.7 F

## 2024-09-11 DIAGNOSIS — M19.90 INFLAMMATORY ARTHRITIS: Primary | ICD-10-CM

## 2024-09-11 DIAGNOSIS — M10.042 ACUTE IDIOPATHIC GOUT OF LEFT HAND: ICD-10-CM

## 2024-09-11 DIAGNOSIS — Z79.899 LONG-TERM USE OF HYDROXYCHLOROQUINE: ICD-10-CM

## 2024-09-11 DIAGNOSIS — M1A.9XX0 CHRONIC GOUT WITHOUT TOPHUS, UNSPECIFIED CAUSE, UNSPECIFIED SITE: ICD-10-CM

## 2024-09-11 PROCEDURE — 99214 OFFICE O/P EST MOD 30 MIN: CPT | Performed by: STUDENT IN AN ORGANIZED HEALTH CARE EDUCATION/TRAINING PROGRAM

## 2024-09-11 PROCEDURE — 1159F MED LIST DOCD IN RCRD: CPT | Performed by: STUDENT IN AN ORGANIZED HEALTH CARE EDUCATION/TRAINING PROGRAM

## 2024-09-11 PROCEDURE — 3008F BODY MASS INDEX DOCD: CPT | Performed by: STUDENT IN AN ORGANIZED HEALTH CARE EDUCATION/TRAINING PROGRAM

## 2024-09-11 PROCEDURE — 1160F RVW MEDS BY RX/DR IN RCRD: CPT | Performed by: STUDENT IN AN ORGANIZED HEALTH CARE EDUCATION/TRAINING PROGRAM

## 2024-09-11 PROCEDURE — 3075F SYST BP GE 130 - 139MM HG: CPT | Performed by: STUDENT IN AN ORGANIZED HEALTH CARE EDUCATION/TRAINING PROGRAM

## 2024-09-11 PROCEDURE — 3079F DIAST BP 80-89 MM HG: CPT | Performed by: STUDENT IN AN ORGANIZED HEALTH CARE EDUCATION/TRAINING PROGRAM

## 2024-09-11 PROCEDURE — 1036F TOBACCO NON-USER: CPT | Performed by: STUDENT IN AN ORGANIZED HEALTH CARE EDUCATION/TRAINING PROGRAM

## 2024-09-11 RX ORDER — ALLOPURINOL 100 MG/1
100 TABLET ORAL DAILY
Qty: 90 TABLET | Refills: 0 | Status: SHIPPED | OUTPATIENT
Start: 2024-09-11

## 2024-09-11 RX ORDER — HYDROXYCHLOROQUINE SULFATE 200 MG/1
200 TABLET, FILM COATED ORAL 2 TIMES DAILY
Qty: 180 TABLET | Refills: 0 | Status: SHIPPED | OUTPATIENT
Start: 2024-09-11

## 2024-09-17 ENCOUNTER — OFFICE VISIT (OUTPATIENT)
Dept: URGENT CARE | Age: 69
End: 2024-09-17
Payer: MEDICARE

## 2024-09-17 VITALS
BODY MASS INDEX: 25.73 KG/M2 | TEMPERATURE: 97.6 F | SYSTOLIC BLOOD PRESSURE: 128 MMHG | RESPIRATION RATE: 18 BRPM | OXYGEN SATURATION: 95 % | HEIGHT: 72 IN | WEIGHT: 190 LBS | HEART RATE: 80 BPM | DIASTOLIC BLOOD PRESSURE: 75 MMHG

## 2024-09-17 DIAGNOSIS — J01.00 ACUTE NON-RECURRENT MAXILLARY SINUSITIS: Primary | ICD-10-CM

## 2024-09-17 DIAGNOSIS — J02.9 SORE THROAT: ICD-10-CM

## 2024-09-17 LAB — POC RAPID STREP: NEGATIVE

## 2024-09-17 RX ORDER — AMOXICILLIN AND CLAVULANATE POTASSIUM 875; 125 MG/1; MG/1
875 TABLET, FILM COATED ORAL 2 TIMES DAILY
Qty: 20 TABLET | Refills: 0 | Status: SHIPPED | OUTPATIENT
Start: 2024-09-17 | End: 2024-09-27

## 2024-09-17 RX ORDER — BENZONATATE 100 MG/1
100 CAPSULE ORAL 3 TIMES DAILY PRN
Qty: 42 CAPSULE | Refills: 0 | Status: SHIPPED | OUTPATIENT
Start: 2024-09-17 | End: 2024-10-17

## 2024-09-17 ASSESSMENT — ENCOUNTER SYMPTOMS
SORE THROAT: 1
COUGH: 1

## 2024-09-17 NOTE — PROGRESS NOTES
Subjective   Patient ID: Jarret Carrasco is a 69 y.o. male. They present today with a chief complaint of Cough and Sore Throat (Cough, sore throat, tired, weak x4 days).    History of Present Illness  Subjective  Jarret Carrasco is a 69 y.o. male who presents for evaluation of symptoms of a URI. Symptoms include congestion and cough described as productive. Onset of symptoms was 4 days ago and has been gradually worsening since that time. Treatment to date: Tylenol.        Assessment/Plan  sinusitis.    Discussed the diagnosis and treatment of sinusitis.  Suggested symptomatic OTC remedies.  Nasal saline spray for congestion.  Follow up as needed.        Cough  Associated symptoms include a sore throat.   Sore Throat   Associated symptoms include coughing.       Past Medical History  Allergies as of 09/17/2024    (No Known Allergies)       (Not in a hospital admission)       Past Medical History:   Diagnosis Date    Abnormal results of kidney function studies     Decreased GFR    Encounter for other preprocedural examination 02/21/2022    Preoperative clearance    Impacted cerumen, bilateral     Impacted cerumen, bilateral    Laceration without foreign body of unspecified hand, initial encounter     Hand laceration    Personal history of other diseases of the nervous system and sense organs     History of acute otitis media    Personal history of other diseases of the respiratory system 05/29/2017    History of acute bronchitis    Sebaceous cyst     Infected sebaceous cyst       Past Surgical History:   Procedure Laterality Date    OTHER SURGICAL HISTORY  09/28/2022    Hip replacement    OTHER SURGICAL HISTORY  12/02/2021    Hernia repair    OTHER SURGICAL HISTORY  12/02/2021    Colonoscopy    OTHER SURGICAL HISTORY  12/02/2021    Cataract surgery        reports that he quit smoking about 50 years ago. His smoking use included cigarettes. He has never used smokeless tobacco. He reports current alcohol use of  about 5.0 standard drinks of alcohol per week. He reports that he does not currently use drugs.    Review of Systems  Review of Systems   HENT:  Positive for sore throat.    Respiratory:  Positive for cough.                                   Objective    Vitals:    09/17/24 0903   BP: 128/75   BP Location: Right arm   Pulse: 80   Resp: 18   Temp: 36.4 °C (97.6 °F)   SpO2: 95%   Weight: 86.2 kg (190 lb)   Height: 1.829 m (6')     No LMP for male patient.    Physical Exam  Constitutional:       Appearance: Normal appearance. He is normal weight.   HENT:      Head: Normocephalic and atraumatic.      Right Ear: Tympanic membrane normal.      Left Ear: Tympanic membrane and ear canal normal.      Nose: Congestion and rhinorrhea present.      Mouth/Throat:      Mouth: Mucous membranes are moist.      Pharynx: Oropharynx is clear.   Eyes:      Extraocular Movements: Extraocular movements intact.      Conjunctiva/sclera: Conjunctivae normal.      Pupils: Pupils are equal, round, and reactive to light.   Cardiovascular:      Rate and Rhythm: Regular rhythm. Tachycardia present.      Pulses: Normal pulses.      Heart sounds: Normal heart sounds.   Pulmonary:      Effort: Pulmonary effort is normal.      Breath sounds: Normal breath sounds.   Musculoskeletal:      Cervical back: Normal range of motion and neck supple.   Neurological:      Mental Status: He is alert.         Procedures    Point of Care Test & Imaging Results from this visit  Results for orders placed or performed in visit on 09/17/24   POCT rapid strep A manually resulted   Result Value Ref Range    POC Rapid Strep Negative Negative      No results found.    Diagnostic study results (if any) were reviewed by Debo Langston MD.    Assessment/Plan   Allergies, medications, history, and pertinent labs/EKGs/Imaging reviewed by Debo Langston MD.     Medical Decision Making      Orders and Diagnoses  Diagnoses and all orders for this visit:  Sore throat  -     POCT  rapid strep A manually resulted      Medical Admin Record      Follow Up Instructions  No follow-ups on file.    Patient disposition: Home    Electronically signed by Debo Langston MD  9:14 AM

## 2024-11-26 ENCOUNTER — APPOINTMENT (OUTPATIENT)
Dept: DERMATOLOGY | Facility: CLINIC | Age: 69
End: 2024-11-26
Payer: MEDICARE

## 2024-12-02 ENCOUNTER — APPOINTMENT (OUTPATIENT)
Dept: PRIMARY CARE | Facility: CLINIC | Age: 69
End: 2024-12-02
Payer: MEDICARE

## 2024-12-02 VITALS
SYSTOLIC BLOOD PRESSURE: 122 MMHG | TEMPERATURE: 97 F | WEIGHT: 189 LBS | BODY MASS INDEX: 26.46 KG/M2 | DIASTOLIC BLOOD PRESSURE: 68 MMHG | HEIGHT: 71 IN | HEART RATE: 90 BPM

## 2024-12-02 DIAGNOSIS — Z12.12 SCREENING FOR COLORECTAL CANCER: ICD-10-CM

## 2024-12-02 DIAGNOSIS — J30.9 ALLERGIC RHINITIS, UNSPECIFIED SEASONALITY, UNSPECIFIED TRIGGER: ICD-10-CM

## 2024-12-02 DIAGNOSIS — Z00.00 ROUTINE GENERAL MEDICAL EXAMINATION AT HEALTH CARE FACILITY: Primary | ICD-10-CM

## 2024-12-02 DIAGNOSIS — I10 BENIGN ESSENTIAL HYPERTENSION: ICD-10-CM

## 2024-12-02 DIAGNOSIS — Z12.11 SCREENING FOR COLORECTAL CANCER: ICD-10-CM

## 2024-12-02 DIAGNOSIS — R73.01 IFG (IMPAIRED FASTING GLUCOSE): ICD-10-CM

## 2024-12-02 DIAGNOSIS — Z87.891 PERSONAL HISTORY OF TOBACCO USE, PRESENTING HAZARDS TO HEALTH: ICD-10-CM

## 2024-12-02 DIAGNOSIS — Z23 NEED FOR VACCINATION: ICD-10-CM

## 2024-12-02 DIAGNOSIS — E78.2 MIXED HYPERLIPIDEMIA: ICD-10-CM

## 2024-12-02 PROCEDURE — 1123F ACP DISCUSS/DSCN MKR DOCD: CPT | Performed by: FAMILY MEDICINE

## 2024-12-02 PROCEDURE — 99214 OFFICE O/P EST MOD 30 MIN: CPT | Performed by: FAMILY MEDICINE

## 2024-12-02 PROCEDURE — G0008 ADMIN INFLUENZA VIRUS VAC: HCPCS | Performed by: FAMILY MEDICINE

## 2024-12-02 PROCEDURE — 3074F SYST BP LT 130 MM HG: CPT | Performed by: FAMILY MEDICINE

## 2024-12-02 PROCEDURE — 90662 IIV NO PRSV INCREASED AG IM: CPT | Performed by: FAMILY MEDICINE

## 2024-12-02 PROCEDURE — 1160F RVW MEDS BY RX/DR IN RCRD: CPT | Performed by: FAMILY MEDICINE

## 2024-12-02 PROCEDURE — G0439 PPPS, SUBSEQ VISIT: HCPCS | Performed by: FAMILY MEDICINE

## 2024-12-02 PROCEDURE — 3078F DIAST BP <80 MM HG: CPT | Performed by: FAMILY MEDICINE

## 2024-12-02 PROCEDURE — 1170F FXNL STATUS ASSESSED: CPT | Performed by: FAMILY MEDICINE

## 2024-12-02 PROCEDURE — 1158F ADVNC CARE PLAN TLK DOCD: CPT | Performed by: FAMILY MEDICINE

## 2024-12-02 PROCEDURE — 3008F BODY MASS INDEX DOCD: CPT | Performed by: FAMILY MEDICINE

## 2024-12-02 PROCEDURE — 1159F MED LIST DOCD IN RCRD: CPT | Performed by: FAMILY MEDICINE

## 2024-12-02 PROCEDURE — 1036F TOBACCO NON-USER: CPT | Performed by: FAMILY MEDICINE

## 2024-12-02 RX ORDER — LISINOPRIL 40 MG/1
40 TABLET ORAL DAILY
Qty: 90 TABLET | Refills: 1 | Status: SHIPPED | OUTPATIENT
Start: 2024-12-02

## 2024-12-02 RX ORDER — IPRATROPIUM BROMIDE 21 UG/1
2 SPRAY, METERED NASAL EVERY 12 HOURS
Qty: 30 ML | Refills: 1 | Status: SHIPPED | OUTPATIENT
Start: 2024-12-02 | End: 2025-12-02

## 2024-12-02 RX ORDER — FLUTICASONE PROPIONATE 50 MCG
1 SPRAY, SUSPENSION (ML) NASAL DAILY
Qty: 16 G | Refills: 3 | Status: SHIPPED | OUTPATIENT
Start: 2024-12-02

## 2024-12-02 RX ORDER — ROSUVASTATIN CALCIUM 10 MG/1
10 TABLET, COATED ORAL DAILY
Qty: 90 TABLET | Refills: 1 | Status: SHIPPED | OUTPATIENT
Start: 2024-12-02

## 2024-12-02 RX ORDER — MONTELUKAST SODIUM 10 MG/1
10 TABLET ORAL NIGHTLY
Qty: 90 TABLET | Refills: 1 | Status: SHIPPED | OUTPATIENT
Start: 2024-12-02

## 2024-12-02 ASSESSMENT — ACTIVITIES OF DAILY LIVING (ADL)
DOING_HOUSEWORK: INDEPENDENT
TAKING_MEDICATION: INDEPENDENT
GROCERY_SHOPPING: INDEPENDENT
MANAGING_FINANCES: INDEPENDENT
DRESSING: INDEPENDENT
BATHING: INDEPENDENT

## 2024-12-02 ASSESSMENT — PATIENT HEALTH QUESTIONNAIRE - PHQ9
1. LITTLE INTEREST OR PLEASURE IN DOING THINGS: NOT AT ALL
SUM OF ALL RESPONSES TO PHQ9 QUESTIONS 1 AND 2: 0
2. FEELING DOWN, DEPRESSED OR HOPELESS: NOT AT ALL

## 2024-12-02 NOTE — PROGRESS NOTES
Subjective   Reason for Visit: Jarret Carrasco is an 69 y.o. male here for a Medicare Wellness visit.     Past Medical, Surgical, and Family History reviewed and updated in chart.    Reviewed all medications by prescribing practitioner or clinical pharmacist (such as prescriptions, OTCs, herbal therapies and supplements) and documented in the medical record.  Medicare Wellness Billing Compliance Satisfied    *This is a visual tool to show completion of required items on the day of the visit. Green checks will only appear on the date of visit.    Review all medications by prescribing practitioner or clinical pharmacist (such as prescriptions, OTCs, herbal therapies and supplements) documented in the medical record    Past Medical, Surgical, and Family History reviewed and updated in chart    Tobacco Use Reviewed    Alcohol Use Reviewed    Illicit Drug Use Reviewed    PHQ2/9    Falls in Last Year Reviewed    Home Safety Risk Factors Reviewed    Cognitive Impairment Reviewed    Patient Self Assessment and Health Status    Current Diet Reviewed    Exercise Frequency    ADL - Hearing Impairment    ADL - Bathing    ADL - Dressing    ADL - Walks in Home    IADL - Managing Finances    IADL - Grocery Shopping    IADL - Taking Medications    IADL - Doing Housework      HPI  Here for annual wellness visit, medication follow-up.  Nutrition-overall balanced, good variety fruits and vegetables.  Needs to increase water intake.  Exercise-golf, tennis, walking.  No weight or resistance training  Sleep-adequate  Eye exam-up-to-date  Dental care-up-to-date  HTN-home 130s/80s.  Denies headaches, lightheadedness, dizziness  Hyperlipidemia-stable on statin.  Denies chest pains, palpitations, dyspnea on exertion  Rhinitis-recent increased frequency of clear watery nasal discharge despite regular use of montelukast and Flonase.  Seems worse with temperature change, moving from indoors to cold outdoors.  Patient Care  "Team:  Marlene Cardozo MD as PCP - General  Marlene Cardozo MD as PCP - [employer] PCP     Review of Systems  Per HPI  Objective   Vitals:  /68 (BP Location: Left arm, Patient Position: Sitting)   Pulse 90   Temp 36.1 °C (97 °F)   Ht 1.803 m (5' 11\")   Wt 85.7 kg (189 lb)   BMI 26.36 kg/m²       Physical Exam  Vitals reviewed.   Constitutional:       General: He is not in acute distress.     Appearance: Normal appearance. He is normal weight.   HENT:      Head: Normocephalic and atraumatic.      Right Ear: Tympanic membrane and ear canal normal.      Left Ear: Tympanic membrane and ear canal normal.      Nose: Rhinorrhea present. No congestion. Rhinorrhea is clear.      Mouth/Throat:      Mouth: Mucous membranes are moist.      Pharynx: Oropharynx is clear.   Eyes:      Extraocular Movements: Extraocular movements intact.      Conjunctiva/sclera: Conjunctivae normal.      Pupils: Pupils are equal, round, and reactive to light.   Cardiovascular:      Rate and Rhythm: Normal rate and regular rhythm.      Pulses: Normal pulses.      Heart sounds: Normal heart sounds. No murmur heard.  Pulmonary:      Effort: Pulmonary effort is normal. No respiratory distress.      Breath sounds: Normal breath sounds.   Abdominal:      General: Abdomen is flat. Bowel sounds are normal.      Palpations: Abdomen is soft.      Tenderness: There is no abdominal tenderness.   Musculoskeletal:         General: Normal range of motion.      Cervical back: Normal range of motion and neck supple.      Right lower leg: No edema.      Left lower leg: No edema.   Lymphadenopathy:      Cervical: No cervical adenopathy.   Skin:     General: Skin is warm and dry.      Findings: No rash.   Neurological:      General: No focal deficit present.      Mental Status: He is alert and oriented to person, place, and time.   Psychiatric:         Mood and Affect: Mood normal.         Thought Content: Thought content normal.         " Judgment: Judgment normal.         Assessment & Plan  Routine general medical examination at health care facility  Encourage continued efforts at healthy food choices, regular physical activity  Recommended incorporating strength and resistance training into his exercise routine  Reviewed recommended vaccines and encouraged to schedule COVID booster, shingles vaccine and tetanus booster at local pharmacy  Orders:    1 Year Follow Up In Primary Care - Wellness Exam; Future    Personal history of tobacco use, presenting hazards to health  Based on past tobacco use and current management of hypertension, recommend abdominal aortic aneurysm screening  Orders:    AAA Screening, Vascular US Abdominal Aorta; Future    Need for vaccination    Orders:    Flu vaccine, high dose    Screening for colorectal cancer  Due for follow-up colonoscopy.  Order provided  Orders:    Referral to Gastroenterology; Future    Allergic rhinitis, unspecified seasonality, unspecified trigger  May have component of vasomotor rhinitis, trial of ipratropium  Orders:    fluticasone (Flonase) 50 mcg/actuation nasal spray; Administer 1 spray into each nostril once daily.    montelukast (Singulair) 10 mg tablet; Take 1 tablet (10 mg) by mouth once daily at bedtime.    ipratropium (Atrovent) 21 mcg (0.03 %) nasal spray; Administer 2 sprays into each nostril every 12 hours.    Benign essential hypertension  Continue lisinopril at current dose  Orders:    lisinopril 40 mg tablet; Take 1 tablet (40 mg) by mouth once daily. as directed    Basic metabolic panel; Future    Mixed hyperlipidemia  Continue statin at current dose  Orders:    rosuvastatin (Crestor) 10 mg tablet; Take 1 tablet (10 mg) by mouth once daily.    IFG (impaired fasting glucose)  Continue to monitor A1c  Orders:    Hemoglobin A1c; Future

## 2024-12-02 NOTE — ASSESSMENT & PLAN NOTE
May have component of vasomotor rhinitis, trial of ipratropium  Orders:    fluticasone (Flonase) 50 mcg/actuation nasal spray; Administer 1 spray into each nostril once daily.    montelukast (Singulair) 10 mg tablet; Take 1 tablet (10 mg) by mouth once daily at bedtime.    ipratropium (Atrovent) 21 mcg (0.03 %) nasal spray; Administer 2 sprays into each nostril every 12 hours.

## 2024-12-02 NOTE — ASSESSMENT & PLAN NOTE
Continue statin at current dose  Orders:    rosuvastatin (Crestor) 10 mg tablet; Take 1 tablet (10 mg) by mouth once daily.

## 2024-12-02 NOTE — ASSESSMENT & PLAN NOTE
Continue lisinopril at current dose  Orders:    lisinopril 40 mg tablet; Take 1 tablet (40 mg) by mouth once daily. as directed    Basic metabolic panel; Future

## 2024-12-10 NOTE — PROGRESS NOTES
Rheumatology Outpatient Follow up Note    Subjective   Jarret Carrasco is a 69 y.o. male presenting today for Inflammatory arthritis.    History of Presenting Problem:   Rheum history:   He was diagnosed with gout many years ago. It usually affects his big toes, ankles and sometimes the hands. He is on allopurinol 100 mg daily, colchicine 0.6 mg as needed. No kidney stones, no skin tophi.   Alcohol usually triggers that.     For the past few months he started noticing stiffness in both hands and puffiness. Since 1/2024 he starting having left wrist pain and swelling that extended to his whole hand. He was seen by ortho and had x-rays and told him it could be RA.  He was given a medrol ailyn which didn't help then he was given 3 weeks of prednisone which seemed to help.     Now he continues to have pain and stiffness in his left hand/wrist>right. He also has pain in his left big toe.     No eye inflammation  No GI inflammation  No enthesitis  No psoriasis  No sicca symptoms  No neuropathy  No ILD  No Raynaud's  No blood clots  No diverticulitis      Interval history:   His hand pain/swelling and stiffness has improved on HCQ. No new symptoms. No skin rashes. Mild gout attack last month in right big toe took 3 doses of colchicine and it helped      Past Medical History:   Past Medical History:   Diagnosis Date    Abnormal results of kidney function studies     Decreased GFR    Encounter for other preprocedural examination 02/21/2022    Preoperative clearance    Impacted cerumen, bilateral     Impacted cerumen, bilateral    Laceration without foreign body of unspecified hand, initial encounter     Hand laceration    Personal history of other diseases of the nervous system and sense organs     History of acute otitis media    Personal history of other diseases of the respiratory system 05/29/2017    History of acute bronchitis    Sebaceous cyst     Infected sebaceous cyst       Allergies: No Known  Allergies    Medications:   Current Outpatient Medications:     allopurinol (Zyloprim) 100 mg tablet, Take 1 tablet (100 mg) by mouth once daily., Disp: 90 tablet, Rfl: 0    colchicine 0.6 mg tablet, 1.2 mg po x1, then 0.6 mg po in 1 hours. Max 3 tabs per acute attack. Do not repeat for 72 hours. (Patient taking differently: 1.2 mg po x1, then 0.6 mg po in 1 hours. Max 3 tabs per acute attack. Do not repeat for 72 hours. prn), Disp: 12 tablet, Rfl: 1    fluticasone (Flonase) 50 mcg/actuation nasal spray, Administer 1 spray into each nostril once daily., Disp: 16 g, Rfl: 3    hydroxychloroquine (Plaquenil) 200 mg tablet, Take 1 tablet (200 mg) by mouth 2 times a day., Disp: 180 tablet, Rfl: 0    ipratropium (Atrovent) 21 mcg (0.03 %) nasal spray, Administer 2 sprays into each nostril every 12 hours., Disp: 30 mL, Rfl: 1    lisinopril 40 mg tablet, Take 1 tablet (40 mg) by mouth once daily. as directed, Disp: 90 tablet, Rfl: 1    montelukast (Singulair) 10 mg tablet, Take 1 tablet (10 mg) by mouth once daily at bedtime., Disp: 90 tablet, Rfl: 1    rosuvastatin (Crestor) 10 mg tablet, Take 1 tablet (10 mg) by mouth once daily., Disp: 90 tablet, Rfl: 1    tadalafil (Cialis) 10 mg tablet, Take 1 tablet (10 mg) by mouth once daily as needed., Disp: , Rfl:     Review of Systems:   Constitutional: Denies fever, chills   Eyes: Denies dry eyes, pain in the eyes   ENT: Denies dry mouth, loss of taste, sores in the mouth  Cardiovascular: Denies chest pain, palpitations   Respiratory: Denies shortness of breath   Gastrointestinal: Denies heartburn   Integumentary: Denies photosensitivity, rash or lesions, Raynaud's   Neurological: Denies any numbness or tingling    MSK: As per HPI.     All 10 review of systems have been reviewed and are negative for complaint except as noted in the HPI    Objective   Physical Examination:  Vitals:    12/11/24 0846   BP: 144/77   Pulse: 83   Temp: 36.4 °C (97.5 °F)             Growth %ile  SmartLinks can only be used for patients less than 20 years old.  Ht Readings from Last 1 Encounters:   12/11/24 1.829 m (6')     Wt Readings from Last 1 Encounters:   12/11/24 86.2 kg (190 lb)       General - NAD, sitting up in chair, well-groomed, pleasant, AAOx3  Head: Normocephalic, atraumatic  Eyes - PERRLA, EOMI. No conjunctiva injection.   \\  Cardiovascular - Normal S1, S2. Regular rate and rhythm. No murmurs or rubs.  Lungs - Symmetric chest expansion. Clear to auscultation bilaterally.   Skin - No rashes or ulcers. Skin warm and dry. No erythema on bilateral cheeks.  Extremities - No edema, cyanosis ,or clubbing  Neurological - Alert and oriented x 3,  grossly intact. No focal deficit.  Musculoskeletal -  Shoulders: Full ROM, without pain, no swelling, warmth or tenderness.  Elbows: Full ROM, without pain, no swelling, warmth or tenderness.  Wrists: mild left wrist tenderness and swelling.   MCP: No swelling, warmth or tenderness. Metacarpal squeeze negative  PIP: No swelling, warmth or tenderness.  DIP: No swelling, warmth or tenderness.  Hands : 5/5.    Sacroiliac joints: No local tenderness. MAU negative.   Hips: Full ROM.  No malalignment.  Knees:  Full ROM, without pain, no swelling, warmth or point tenderness.   Ankles: Full ROM, without pain, swelling, warmth or tenderness.  Toes:  Metatarsal squeeze negative  Cervical spine: No tenderness or limitation of movement  Lumbar spine: No tenderness or limitation of movement        Assessment/Plan   Jarret Carrasco is a 69 y.o. male with PMHx of CAD, gout, HTN, HLP, Allergic rhinitis, ?RA,  who is presenting today as a follow up for inflammatory arthritis    ##Inflammatory arthritis:  -Mainly in hands and left hand x-ray concerning for erosive changes.   -ESR/CRP/RF/CCP negative but he was on steroids so it may mask ESR/CRP. . He responded to steroids previously. On exam he has swelling of the left wrist. Repeat ESR/CRP  3/2024 also normal?.   -We  started prednisone taper and HCQ trial& he responded  well so we will continue HCQ   -HB/hepatitis tests are negative 3/2024 in preparation for biologics but defer for now.  -Reviewed labs 6/2024: ESR/CRP normal, CBC/CMP at baseline    ##HCQ long term use:  -Educated the patient about the potential side effects of using antimalarials. Serious side effects are extremely rare. However, the fear of vision-threatening toxic retinopathy remains a major concern, this can be avoided when using the appropriate doses along with routine ocular monitoring.  -Current dose is 200 mg BID (<5mg/kg/day)  -Last eye exam 4/2024: normal OCT baseline      ##Chronic gout:  -He had right bog toe minor flare 11/2024 and responded to colchicine   -Continue Allopurinol 100 mg daily  -Last sUA is 6.1 in 3/2024 recheck now      The assessment and plan, risk and benefits were discussed with the patient. All of the patients questions were answered and patient agrees to the plan.      Selam Carter MD  Clinical   Department of Rheumatology   [facility]

## 2024-12-11 ENCOUNTER — APPOINTMENT (OUTPATIENT)
Dept: RHEUMATOLOGY | Facility: CLINIC | Age: 69
End: 2024-12-11
Payer: MEDICARE

## 2024-12-11 VITALS
WEIGHT: 190 LBS | SYSTOLIC BLOOD PRESSURE: 144 MMHG | HEART RATE: 83 BPM | BODY MASS INDEX: 25.73 KG/M2 | DIASTOLIC BLOOD PRESSURE: 77 MMHG | HEIGHT: 72 IN | TEMPERATURE: 97.5 F

## 2024-12-11 DIAGNOSIS — M1A.9XX0 CHRONIC GOUT WITHOUT TOPHUS, UNSPECIFIED CAUSE, UNSPECIFIED SITE: ICD-10-CM

## 2024-12-11 DIAGNOSIS — Z79.899 LONG-TERM USE OF HYDROXYCHLOROQUINE: ICD-10-CM

## 2024-12-11 DIAGNOSIS — M19.90 INFLAMMATORY ARTHRITIS: Primary | ICD-10-CM

## 2024-12-11 DIAGNOSIS — M10.042 ACUTE IDIOPATHIC GOUT OF LEFT HAND: ICD-10-CM

## 2024-12-11 PROCEDURE — 1160F RVW MEDS BY RX/DR IN RCRD: CPT | Performed by: STUDENT IN AN ORGANIZED HEALTH CARE EDUCATION/TRAINING PROGRAM

## 2024-12-11 PROCEDURE — 1036F TOBACCO NON-USER: CPT | Performed by: STUDENT IN AN ORGANIZED HEALTH CARE EDUCATION/TRAINING PROGRAM

## 2024-12-11 PROCEDURE — G2211 COMPLEX E/M VISIT ADD ON: HCPCS | Performed by: STUDENT IN AN ORGANIZED HEALTH CARE EDUCATION/TRAINING PROGRAM

## 2024-12-11 PROCEDURE — 3008F BODY MASS INDEX DOCD: CPT | Performed by: STUDENT IN AN ORGANIZED HEALTH CARE EDUCATION/TRAINING PROGRAM

## 2024-12-11 PROCEDURE — 3078F DIAST BP <80 MM HG: CPT | Performed by: STUDENT IN AN ORGANIZED HEALTH CARE EDUCATION/TRAINING PROGRAM

## 2024-12-11 PROCEDURE — 1123F ACP DISCUSS/DSCN MKR DOCD: CPT | Performed by: STUDENT IN AN ORGANIZED HEALTH CARE EDUCATION/TRAINING PROGRAM

## 2024-12-11 PROCEDURE — 3077F SYST BP >= 140 MM HG: CPT | Performed by: STUDENT IN AN ORGANIZED HEALTH CARE EDUCATION/TRAINING PROGRAM

## 2024-12-11 PROCEDURE — 99214 OFFICE O/P EST MOD 30 MIN: CPT | Performed by: STUDENT IN AN ORGANIZED HEALTH CARE EDUCATION/TRAINING PROGRAM

## 2024-12-11 PROCEDURE — 1159F MED LIST DOCD IN RCRD: CPT | Performed by: STUDENT IN AN ORGANIZED HEALTH CARE EDUCATION/TRAINING PROGRAM

## 2024-12-11 RX ORDER — ALLOPURINOL 100 MG/1
100 TABLET ORAL DAILY
Qty: 90 TABLET | Refills: 0 | Status: SHIPPED | OUTPATIENT
Start: 2024-12-11

## 2024-12-11 RX ORDER — HYDROXYCHLOROQUINE SULFATE 200 MG/1
200 TABLET, FILM COATED ORAL 2 TIMES DAILY
Qty: 180 TABLET | Refills: 0 | Status: SHIPPED | OUTPATIENT
Start: 2024-12-11

## 2024-12-11 RX ORDER — COLCHICINE 0.6 MG/1
TABLET ORAL
Qty: 12 TABLET | Refills: 1 | Status: SHIPPED | OUTPATIENT
Start: 2024-12-11

## 2025-02-26 ENCOUNTER — APPOINTMENT (OUTPATIENT)
Dept: GASTROENTEROLOGY | Facility: CLINIC | Age: 70
End: 2025-02-26
Payer: MEDICARE

## 2025-03-12 ENCOUNTER — APPOINTMENT (OUTPATIENT)
Facility: CLINIC | Age: 70
End: 2025-03-12
Payer: MEDICARE

## 2025-03-20 NOTE — PROGRESS NOTES
Rheumatology Outpatient Follow up Note    Subjective   Jarret Carrasco is a 69 y.o. male presenting today for Joint Pain.    History of Presenting Problem:   Rheum history:   He was diagnosed with gout many years ago. It usually affects his big toes, ankles and sometimes the hands. He is on allopurinol 100 mg daily, colchicine 0.6 mg as needed. No kidney stones, no skin tophi.   Alcohol usually triggers that.     For the past few months he started noticing stiffness in both hands and puffiness. Since 1/2024 he starting having left wrist pain and swelling that extended to his whole hand. He was seen by ortho and had x-rays and told him it could be RA.  He was given a medrol ailyn which didn't help then he was given 3 weeks of prednisone which seemed to help.     Now he continues to have pain and stiffness in his left hand/wrist>right. He also has pain in his left big toe.     No eye inflammation  No GI inflammation  No enthesitis  No psoriasis  No sicca symptoms  No neuropathy  No ILD  No Raynaud's  No blood clots  No diverticulitis      Interval history:   His hand pain/swelling and stiffness has improved on HCQ. No new symptoms. No skin rashes. Mild gout attack earlier this month in right big toe took 3 doses of colchicine and it helped      Past Medical History:   Past Medical History:   Diagnosis Date    Abnormal results of kidney function studies     Decreased GFR    Encounter for other preprocedural examination 02/21/2022    Preoperative clearance    Impacted cerumen, bilateral     Impacted cerumen, bilateral    Laceration without foreign body of unspecified hand, initial encounter     Hand laceration    Personal history of other diseases of the nervous system and sense organs     History of acute otitis media    Personal history of other diseases of the respiratory system 05/29/2017    History of acute bronchitis    Sebaceous cyst     Infected sebaceous cyst       Allergies: No Known Allergies    Medications:    Current Outpatient Medications:     allopurinol (Zyloprim) 100 mg tablet, Take 1 tablet (100 mg) by mouth once daily., Disp: 90 tablet, Rfl: 0    colchicine 0.6 mg tablet, 1.2 mg po x1, then 0.6 mg po in 1 hours. Max 3 tabs per acute attack. Do not repeat for 72 hours., Disp: 12 tablet, Rfl: 1    fluticasone (Flonase) 50 mcg/actuation nasal spray, Administer 1 spray into each nostril once daily., Disp: 16 g, Rfl: 3    hydroxychloroquine (Plaquenil) 200 mg tablet, Take 1 tablet (200 mg) by mouth 2 times a day., Disp: 180 tablet, Rfl: 0    ipratropium (Atrovent) 21 mcg (0.03 %) nasal spray, Administer 2 sprays into each nostril every 12 hours., Disp: 30 mL, Rfl: 1    lisinopril 40 mg tablet, Take 1 tablet (40 mg) by mouth once daily. as directed, Disp: 90 tablet, Rfl: 1    montelukast (Singulair) 10 mg tablet, Take 1 tablet (10 mg) by mouth once daily at bedtime., Disp: 90 tablet, Rfl: 1    rosuvastatin (Crestor) 10 mg tablet, Take 1 tablet (10 mg) by mouth once daily., Disp: 90 tablet, Rfl: 1    tadalafil (Cialis) 10 mg tablet, Take 1 tablet (10 mg) by mouth once daily as needed., Disp: , Rfl:     Review of Systems:   All 10 review of systems have been reviewed and are negative for complaint except as noted in the HPI    Objective   Physical Examination:  Vitals:    03/24/25 0920   BP: (!) 147/91   Pulse: 93   Temp: 36.2 °C (97.2 °F)               Growth %ile SmartLinks can only be used for patients less than 20 years old.  Ht Readings from Last 1 Encounters:   03/24/25 1.829 m (6')     Wt Readings from Last 1 Encounters:   03/24/25 87.1 kg (192 lb)     Copied from last visit  General - NAD, sitting up in chair, well-groomed, pleasant, AAOx3  Head: Normocephalic, atraumatic  Eyes - PERRLA, EOMI. No conjunctiva injection.   Skin - No rashes or ulcers. Skin warm and dry. No erythema on bilateral cheeks.  Extremities - No edema, cyanosis ,or clubbing  Neurological - Alert and oriented x 3,  grossly intact. No focal  deficit.  Musculoskeletal -  Shoulders: Full ROM, without pain, no swelling, warmth or tenderness.  Elbows: Full ROM, without pain, no swelling, warmth or tenderness.  Wrists: mild left wrist tenderness and swelling.   MCP: No swelling, warmth or tenderness. Metacarpal squeeze negative  PIP: No swelling, warmth or tenderness.  DIP: No swelling, warmth or tenderness.  Hands : 5/5.    Sacroiliac joints: No local tenderness. MAU negative.   Hips: Full ROM.  No malalignment.  Knees:  Full ROM, without pain, no swelling, warmth or point tenderness.   Ankles: Full ROM, without pain, swelling, warmth or tenderness.  Toes:  Metatarsal squeeze negative  Cervical spine: No tenderness or limitation of movement  Lumbar spine: No tenderness or limitation of movement        Assessment/Plan   Jarret Carrasco is a 69 y.o. male with PMHx of CAD, gout, HTN, HLP, Allergic rhinitis, ?RA,  who is presenting today as a follow up for inflammatory arthritis    ##Inflammatory arthritis:  -Mainly in hands and left hand x-ray concerning for erosive changes.   -ESR/CRP/RF/CCP negative but he was on steroids so it may mask ESR/CRP. . He responded to steroids previously. On exam he has swelling of the left wrist. Repeat ESR/CRP  3/2024 also normal?.   -Continue  mg daily  -HB/hepatitis tests are negative 3/2024 in preparation for biologics but defer for now.  -Reviewed labs 6/2024: ESR/CRP normal, CBC/CMP at baseline    ##HCQ long term use:  -Educated the patient about the potential side effects of using antimalarials. Serious side effects are extremely rare. However, the fear of vision-threatening toxic retinopathy remains a major concern, this can be avoided when using the appropriate doses along with routine ocular monitoring.  -Current dose is 200 mg BID (<5mg/kg/day)  -Last eye exam 4/2024: normal OCT baseline      ##Chronic gout:  -He had right bog toe minor flare 3/2025 and responded to colchicine   -Continue Allopurinol  100 mg daily  -Continue colchicine PRN  -Last sUA is 6.1 in 3/2024 recheck now      The assessment and plan, risk and benefits were discussed with the patient. All of the patients questions were answered and patient agrees to the plan.      Selam Carter MD  Clinical   Department of Rheumatology   [facility]

## 2025-03-24 ENCOUNTER — APPOINTMENT (OUTPATIENT)
Facility: CLINIC | Age: 70
End: 2025-03-24
Payer: MEDICARE

## 2025-03-24 VITALS
HEIGHT: 72 IN | SYSTOLIC BLOOD PRESSURE: 147 MMHG | TEMPERATURE: 97.2 F | BODY MASS INDEX: 26.01 KG/M2 | DIASTOLIC BLOOD PRESSURE: 91 MMHG | WEIGHT: 192 LBS | HEART RATE: 93 BPM

## 2025-03-24 DIAGNOSIS — M10.042 ACUTE IDIOPATHIC GOUT OF LEFT HAND: ICD-10-CM

## 2025-03-24 DIAGNOSIS — M1A.9XX0 CHRONIC GOUT WITHOUT TOPHUS, UNSPECIFIED CAUSE, UNSPECIFIED SITE: ICD-10-CM

## 2025-03-24 DIAGNOSIS — M19.90 INFLAMMATORY ARTHRITIS: Primary | ICD-10-CM

## 2025-03-24 DIAGNOSIS — Z79.899 LONG-TERM USE OF HYDROXYCHLOROQUINE: ICD-10-CM

## 2025-03-24 PROCEDURE — G2211 COMPLEX E/M VISIT ADD ON: HCPCS | Performed by: STUDENT IN AN ORGANIZED HEALTH CARE EDUCATION/TRAINING PROGRAM

## 2025-03-24 PROCEDURE — 1123F ACP DISCUSS/DSCN MKR DOCD: CPT | Performed by: STUDENT IN AN ORGANIZED HEALTH CARE EDUCATION/TRAINING PROGRAM

## 2025-03-24 PROCEDURE — 3077F SYST BP >= 140 MM HG: CPT | Performed by: STUDENT IN AN ORGANIZED HEALTH CARE EDUCATION/TRAINING PROGRAM

## 2025-03-24 PROCEDURE — 99214 OFFICE O/P EST MOD 30 MIN: CPT | Performed by: STUDENT IN AN ORGANIZED HEALTH CARE EDUCATION/TRAINING PROGRAM

## 2025-03-24 PROCEDURE — 1159F MED LIST DOCD IN RCRD: CPT | Performed by: STUDENT IN AN ORGANIZED HEALTH CARE EDUCATION/TRAINING PROGRAM

## 2025-03-24 PROCEDURE — 3080F DIAST BP >= 90 MM HG: CPT | Performed by: STUDENT IN AN ORGANIZED HEALTH CARE EDUCATION/TRAINING PROGRAM

## 2025-03-24 PROCEDURE — 3008F BODY MASS INDEX DOCD: CPT | Performed by: STUDENT IN AN ORGANIZED HEALTH CARE EDUCATION/TRAINING PROGRAM

## 2025-03-24 PROCEDURE — 1036F TOBACCO NON-USER: CPT | Performed by: STUDENT IN AN ORGANIZED HEALTH CARE EDUCATION/TRAINING PROGRAM

## 2025-03-24 PROCEDURE — 1160F RVW MEDS BY RX/DR IN RCRD: CPT | Performed by: STUDENT IN AN ORGANIZED HEALTH CARE EDUCATION/TRAINING PROGRAM

## 2025-03-24 RX ORDER — ALLOPURINOL 100 MG/1
100 TABLET ORAL DAILY
Qty: 90 TABLET | Refills: 0 | Status: SHIPPED | OUTPATIENT
Start: 2025-03-24

## 2025-03-24 RX ORDER — COLCHICINE 0.6 MG/1
TABLET ORAL
Qty: 12 TABLET | Refills: 1 | Status: SHIPPED | OUTPATIENT
Start: 2025-03-24

## 2025-03-24 RX ORDER — HYDROXYCHLOROQUINE SULFATE 200 MG/1
200 TABLET, FILM COATED ORAL 2 TIMES DAILY
Qty: 180 TABLET | Refills: 0 | Status: SHIPPED | OUTPATIENT
Start: 2025-03-24

## 2025-07-09 LAB
ALBUMIN SERPL-MCNC: 4.5 G/DL (ref 3.6–5.1)
ALP SERPL-CCNC: 51 U/L (ref 35–144)
ALT SERPL-CCNC: 26 U/L (ref 9–46)
ANION GAP SERPL CALCULATED.4IONS-SCNC: 8 MMOL/L (CALC) (ref 7–17)
ANION GAP SERPL CALCULATED.4IONS-SCNC: 9 MMOL/L (CALC) (ref 7–17)
AST SERPL-CCNC: 20 U/L (ref 10–35)
BASOPHILS # BLD AUTO: 40 CELLS/UL (ref 0–200)
BASOPHILS NFR BLD AUTO: 0.9 %
BILIRUB SERPL-MCNC: 0.6 MG/DL (ref 0.2–1.2)
BUN SERPL-MCNC: 18 MG/DL (ref 7–25)
BUN SERPL-MCNC: 18 MG/DL (ref 7–25)
BUN/CREAT SERPL: ABNORMAL (CALC) (ref 6–22)
CALCIUM SERPL-MCNC: 9.7 MG/DL (ref 8.6–10.3)
CALCIUM SERPL-MCNC: 9.9 MG/DL (ref 8.6–10.3)
CHLORIDE SERPL-SCNC: 105 MMOL/L (ref 98–110)
CHLORIDE SERPL-SCNC: 105 MMOL/L (ref 98–110)
CO2 SERPL-SCNC: 26 MMOL/L (ref 20–32)
CO2 SERPL-SCNC: 28 MMOL/L (ref 20–32)
CREAT SERPL-MCNC: 1.22 MG/DL (ref 0.7–1.28)
CREAT SERPL-MCNC: 1.22 MG/DL (ref 0.7–1.28)
CRP SERPL-MCNC: <3 MG/L
EGFRCR SERPLBLD CKD-EPI 2021: 63 ML/MIN/1.73M2
EGFRCR SERPLBLD CKD-EPI 2021: 63 ML/MIN/1.73M2
EOSINOPHIL # BLD AUTO: 101 CELLS/UL (ref 15–500)
EOSINOPHIL NFR BLD AUTO: 2.3 %
ERYTHROCYTE [DISTWIDTH] IN BLOOD BY AUTOMATED COUNT: 12 % (ref 11–15)
ERYTHROCYTE [SEDIMENTATION RATE] IN BLOOD BY WESTERGREN METHOD: 25 MM/H
EST. AVERAGE GLUCOSE BLD GHB EST-MCNC: 120 MG/DL
EST. AVERAGE GLUCOSE BLD GHB EST-SCNC: 6.6 MMOL/L
GLUCOSE SERPL-MCNC: 123 MG/DL (ref 65–99)
GLUCOSE SERPL-MCNC: 124 MG/DL (ref 65–99)
HBA1C MFR BLD: 5.8 %
HCT VFR BLD AUTO: 41.7 % (ref 38.5–50)
HGB BLD-MCNC: 13.6 G/DL (ref 13.2–17.1)
LYMPHOCYTES # BLD AUTO: 1333 CELLS/UL (ref 850–3900)
LYMPHOCYTES NFR BLD AUTO: 30.3 %
MCH RBC QN AUTO: 31.9 PG (ref 27–33)
MCHC RBC AUTO-ENTMCNC: 32.6 G/DL (ref 32–36)
MCV RBC AUTO: 97.7 FL (ref 80–100)
MONOCYTES # BLD AUTO: 462 CELLS/UL (ref 200–950)
MONOCYTES NFR BLD AUTO: 10.5 %
NEUTROPHILS # BLD AUTO: 2464 CELLS/UL (ref 1500–7800)
NEUTROPHILS NFR BLD AUTO: 56 %
PLATELET # BLD AUTO: 203 THOUSAND/UL (ref 140–400)
PMV BLD REES-ECKER: 10 FL (ref 7.5–12.5)
POTASSIUM SERPL-SCNC: 4.8 MMOL/L (ref 3.5–5.3)
POTASSIUM SERPL-SCNC: 4.9 MMOL/L (ref 3.5–5.3)
PROT SERPL-MCNC: 6.6 G/DL (ref 6.1–8.1)
RBC # BLD AUTO: 4.27 MILLION/UL (ref 4.2–5.8)
SODIUM SERPL-SCNC: 140 MMOL/L (ref 135–146)
SODIUM SERPL-SCNC: 141 MMOL/L (ref 135–146)
URATE SERPL-MCNC: 6.3 MG/DL (ref 4–8)
WBC # BLD AUTO: 4.4 THOUSAND/UL (ref 3.8–10.8)

## 2025-07-16 ENCOUNTER — APPOINTMENT (OUTPATIENT)
Facility: CLINIC | Age: 70
End: 2025-07-16
Payer: MEDICARE

## 2025-07-16 VITALS
BODY MASS INDEX: 24.65 KG/M2 | DIASTOLIC BLOOD PRESSURE: 82 MMHG | SYSTOLIC BLOOD PRESSURE: 146 MMHG | HEIGHT: 72 IN | WEIGHT: 182 LBS | HEART RATE: 80 BPM | TEMPERATURE: 98.1 F

## 2025-07-16 DIAGNOSIS — M10.042 ACUTE IDIOPATHIC GOUT OF LEFT HAND: ICD-10-CM

## 2025-07-16 DIAGNOSIS — Z79.899 LONG-TERM USE OF HYDROXYCHLOROQUINE: ICD-10-CM

## 2025-07-16 DIAGNOSIS — M19.90 INFLAMMATORY ARTHRITIS: Primary | ICD-10-CM

## 2025-07-16 DIAGNOSIS — M1A.9XX0 CHRONIC GOUT WITHOUT TOPHUS, UNSPECIFIED CAUSE, UNSPECIFIED SITE: ICD-10-CM

## 2025-07-16 PROCEDURE — 3008F BODY MASS INDEX DOCD: CPT | Performed by: STUDENT IN AN ORGANIZED HEALTH CARE EDUCATION/TRAINING PROGRAM

## 2025-07-16 PROCEDURE — 1159F MED LIST DOCD IN RCRD: CPT | Performed by: STUDENT IN AN ORGANIZED HEALTH CARE EDUCATION/TRAINING PROGRAM

## 2025-07-16 PROCEDURE — 1160F RVW MEDS BY RX/DR IN RCRD: CPT | Performed by: STUDENT IN AN ORGANIZED HEALTH CARE EDUCATION/TRAINING PROGRAM

## 2025-07-16 PROCEDURE — 99214 OFFICE O/P EST MOD 30 MIN: CPT | Performed by: STUDENT IN AN ORGANIZED HEALTH CARE EDUCATION/TRAINING PROGRAM

## 2025-07-16 PROCEDURE — 3079F DIAST BP 80-89 MM HG: CPT | Performed by: STUDENT IN AN ORGANIZED HEALTH CARE EDUCATION/TRAINING PROGRAM

## 2025-07-16 PROCEDURE — G2211 COMPLEX E/M VISIT ADD ON: HCPCS | Performed by: STUDENT IN AN ORGANIZED HEALTH CARE EDUCATION/TRAINING PROGRAM

## 2025-07-16 PROCEDURE — 3077F SYST BP >= 140 MM HG: CPT | Performed by: STUDENT IN AN ORGANIZED HEALTH CARE EDUCATION/TRAINING PROGRAM

## 2025-07-16 RX ORDER — HYDROXYCHLOROQUINE SULFATE 200 MG/1
200 TABLET, FILM COATED ORAL 2 TIMES DAILY
Qty: 180 TABLET | Refills: 0 | Status: SHIPPED | OUTPATIENT
Start: 2025-07-16

## 2025-07-16 RX ORDER — COLCHICINE 0.6 MG/1
TABLET ORAL
Qty: 12 TABLET | Refills: 1 | Status: SHIPPED | OUTPATIENT
Start: 2025-07-16

## 2025-07-16 RX ORDER — ALLOPURINOL 100 MG/1
100 TABLET ORAL DAILY
Qty: 90 TABLET | Refills: 0 | Status: SHIPPED | OUTPATIENT
Start: 2025-07-16

## 2025-07-16 NOTE — PROGRESS NOTES
Rheumatology Outpatient Follow up Note    Subjective   Jarret Carrasco is a 69 y.o. male presenting today for Joint Pain.    History of Presenting Problem:   Rheum history:   He was diagnosed with gout many years ago. It usually affects his big toes, ankles and sometimes the hands. He is on allopurinol 100 mg daily, colchicine 0.6 mg as needed. No kidney stones, no skin tophi.   Alcohol usually triggers that.     For the past few months he started noticing stiffness in both hands and puffiness. Since 1/2024 he starting having left wrist pain and swelling that extended to his whole hand. He was seen by ortho and had x-rays and told him it could be RA.  He was given a medrol ailyn which didn't help then he was given 3 weeks of prednisone which seemed to help.     Now he continues to have pain and stiffness in his left hand/wrist>right. He also has pain in his left big toe.     No eye inflammation  No GI inflammation  No enthesitis  No psoriasis  No sicca symptoms  No neuropathy  No ILD  No Raynaud's  No blood clots  No diverticulitis      Interval history:   His hand pain/swelling and stiffness has improved on HCQ. No new symptoms. No skin rashes. Mild gout attack ~ June in left big toe took colchicine and it helped      Past Medical History:   Past Medical History:   Diagnosis Date    Abnormal results of kidney function studies     Decreased GFR    Encounter for other preprocedural examination 02/21/2022    Preoperative clearance    Impacted cerumen, bilateral     Impacted cerumen, bilateral    Laceration without foreign body of unspecified hand, initial encounter     Hand laceration    Personal history of other diseases of the nervous system and sense organs     History of acute otitis media    Personal history of other diseases of the respiratory system 05/29/2017    History of acute bronchitis    Sebaceous cyst     Infected sebaceous cyst       Allergies: No Known Allergies    Medications:   Current Outpatient  Medications:     allopurinol (Zyloprim) 100 mg tablet, Take 1 tablet (100 mg) by mouth once daily., Disp: 90 tablet, Rfl: 0    colchicine 0.6 mg tablet, 1.2 mg po x1, then 0.6 mg po in 1 hours. Max 3 tabs per acute attack. Do not repeat for 72 hours., Disp: 12 tablet, Rfl: 1    fluticasone (Flonase) 50 mcg/actuation nasal spray, Administer 1 spray into each nostril once daily., Disp: 16 g, Rfl: 3    hydroxychloroquine (Plaquenil) 200 mg tablet, Take 1 tablet (200 mg) by mouth 2 times a day., Disp: 180 tablet, Rfl: 0    ipratropium (Atrovent) 21 mcg (0.03 %) nasal spray, Administer 2 sprays into each nostril every 12 hours., Disp: 30 mL, Rfl: 1    lisinopril 40 mg tablet, Take 1 tablet (40 mg) by mouth once daily. as directed, Disp: 90 tablet, Rfl: 1    montelukast (Singulair) 10 mg tablet, Take 1 tablet (10 mg) by mouth once daily at bedtime., Disp: 90 tablet, Rfl: 1    rosuvastatin (Crestor) 10 mg tablet, Take 1 tablet (10 mg) by mouth once daily., Disp: 90 tablet, Rfl: 1    tadalafil (Cialis) 10 mg tablet, Take 1 tablet (10 mg) by mouth once daily as needed., Disp: , Rfl:     Review of Systems:   All 10 review of systems have been reviewed and are negative for complaint except as noted in the HPI    Objective   Physical Examination:  There were no vitals filed for this visit.    Growth %ile SmartLinks can only be used for patients less than 20 years old.  Ht Readings from Last 1 Encounters:   07/16/25 1.829 m (6')     Wt Readings from Last 1 Encounters:   03/24/25 87.1 kg (192 lb)       General - NAD, sitting up in chair, well-groomed, pleasant, AAOx3  Head: Normocephalic, atraumatic  Eyes - PERRLA, EOMI. No conjunctiva injection.   Skin - No rashes or ulcers. Skin warm and dry. No erythema on bilateral cheeks.  Extremities - No edema, cyanosis ,or clubbing  Neurological - Alert and oriented x 3,  grossly intact. No focal deficit.  Musculoskeletal -  Shoulders: Full ROM, without pain, no swelling, warmth or  tenderness.  Elbows: Full ROM, without pain, no swelling, warmth or tenderness.  Wrists: mild left wrist tenderness and swelling.   MCP: No swelling, warmth or tenderness. Metacarpal squeeze negative  PIP: No swelling, warmth or tenderness.  DIP: No swelling, warmth or tenderness.  Hands : 5/5.    Sacroiliac joints: No local tenderness. MAU negative.   Hips: Full ROM.  No malalignment.  Knees:  Full ROM, without pain, no swelling, warmth or point tenderness.   Ankles: Full ROM, without pain, swelling, warmth or tenderness.  Toes:  Metatarsal squeeze negative  Cervical spine: No tenderness or limitation of movement  Lumbar spine: No tenderness or limitation of movement      Assessment/Plan   Jarret Carrasco is a 69 y.o. male with PMHx of CAD, gout, HTN, HLP, Allergic rhinitis, ?RA,  who is presenting today as a follow up for inflammatory arthritis    ##Inflammatory arthritis:  -Mainly in hands and left hand x-ray concerning for erosive changes.   -ESR/CRP/RF/CCP negative but he was on steroids so it may mask ESR/CRP. . He responded to steroids previously. On exam he has swelling of the left wrist. Repeat ESR/CRP  3/2024 also normal?.   -Continue  mg daily  -HB/hepatitis tests are negative 3/2024 in preparation for biologics but defer for now.  -Reviewed labs 7/2025: CRP normal, ESR 25, CBC/CMP at baseline    ##HCQ long term use:  -Educated the patient about the potential side effects of using antimalarials. Serious side effects are extremely rare. However, the fear of vision-threatening toxic retinopathy remains a major concern, this can be avoided when using the appropriate doses along with routine ocular monitoring.  -Current dose is 200 mg BID (<5mg/kg/day)  -Last eye exam 4/2024: normal OCT baseline. Scheduled from 8/2025    ##Chronic gout:  -He had left bog toe minor flare 6/2025 and responded to colchicine   -Continue Allopurinol 100 mg daily  -Continue colchicine PRN  -Last sUA is 6.3 in  7/2025      The assessment and plan, risk and benefits were discussed with the patient. All of the patients questions were answered and patient agrees to the plan.      Selam Carter MD  Clinical   Department of Rheumatology   [facility]

## 2025-07-21 DIAGNOSIS — M19.90 INFLAMMATORY ARTHRITIS: ICD-10-CM

## 2025-07-21 DIAGNOSIS — M1A.9XX0 CHRONIC GOUT WITHOUT TOPHUS, UNSPECIFIED CAUSE, UNSPECIFIED SITE: ICD-10-CM

## 2025-07-21 DIAGNOSIS — M10.042 ACUTE IDIOPATHIC GOUT OF LEFT HAND: ICD-10-CM

## 2025-07-21 RX ORDER — ALLOPURINOL 100 MG/1
100 TABLET ORAL DAILY
Qty: 90 TABLET | Refills: 0 | Status: SHIPPED | OUTPATIENT
Start: 2025-07-21

## 2025-08-08 DIAGNOSIS — I10 BENIGN ESSENTIAL HYPERTENSION: ICD-10-CM

## 2025-08-08 DIAGNOSIS — E78.2 MIXED HYPERLIPIDEMIA: ICD-10-CM

## 2025-08-08 DIAGNOSIS — J30.9 ALLERGIC RHINITIS, UNSPECIFIED SEASONALITY, UNSPECIFIED TRIGGER: ICD-10-CM

## 2025-08-08 RX ORDER — ROSUVASTATIN CALCIUM 10 MG/1
10 TABLET, COATED ORAL DAILY
Qty: 90 TABLET | Refills: 0 | Status: SHIPPED | OUTPATIENT
Start: 2025-08-08

## 2025-08-08 RX ORDER — LISINOPRIL 40 MG/1
TABLET ORAL
Qty: 90 TABLET | Refills: 0 | Status: SHIPPED | OUTPATIENT
Start: 2025-08-08

## 2025-08-08 RX ORDER — MONTELUKAST SODIUM 10 MG/1
10 TABLET ORAL NIGHTLY
Qty: 90 TABLET | Refills: 0 | Status: SHIPPED | OUTPATIENT
Start: 2025-08-08

## 2025-08-21 ENCOUNTER — APPOINTMENT (OUTPATIENT)
Dept: PRIMARY CARE | Facility: CLINIC | Age: 70
End: 2025-08-21
Payer: MEDICARE

## 2025-08-21 VITALS
HEIGHT: 72 IN | HEART RATE: 71 BPM | BODY MASS INDEX: 25.27 KG/M2 | TEMPERATURE: 97.5 F | DIASTOLIC BLOOD PRESSURE: 84 MMHG | SYSTOLIC BLOOD PRESSURE: 131 MMHG | WEIGHT: 186.6 LBS

## 2025-08-21 DIAGNOSIS — J30.9 ALLERGIC RHINITIS, UNSPECIFIED SEASONALITY, UNSPECIFIED TRIGGER: ICD-10-CM

## 2025-08-21 DIAGNOSIS — M06.9 ACUTE RHEUMATOID ARTHRITIS (MULTI): ICD-10-CM

## 2025-08-21 DIAGNOSIS — Z12.5 SCREENING PSA (PROSTATE SPECIFIC ANTIGEN): ICD-10-CM

## 2025-08-21 DIAGNOSIS — Z87.891 PERSONAL HISTORY OF TOBACCO USE, PRESENTING HAZARDS TO HEALTH: ICD-10-CM

## 2025-08-21 DIAGNOSIS — R73.01 IFG (IMPAIRED FASTING GLUCOSE): ICD-10-CM

## 2025-08-21 DIAGNOSIS — I10 BENIGN ESSENTIAL HYPERTENSION: ICD-10-CM

## 2025-08-21 DIAGNOSIS — I25.10 ATHEROSCLEROSIS OF NATIVE CORONARY ARTERY OF NATIVE HEART WITHOUT ANGINA PECTORIS: ICD-10-CM

## 2025-08-21 DIAGNOSIS — Z23 NEED FOR VACCINATION: ICD-10-CM

## 2025-08-21 DIAGNOSIS — Z00.00 ROUTINE GENERAL MEDICAL EXAMINATION AT HEALTH CARE FACILITY: Primary | ICD-10-CM

## 2025-08-21 RX ORDER — IPRATROPIUM BROMIDE 21 UG/1
2 SPRAY, METERED NASAL EVERY 12 HOURS
Qty: 30 ML | Refills: 1 | Status: SHIPPED | OUTPATIENT
Start: 2025-08-21 | End: 2026-08-21

## 2025-08-21 RX ORDER — FLUTICASONE PROPIONATE 50 MCG
1 SPRAY, SUSPENSION (ML) NASAL DAILY
Qty: 16 G | Refills: 3 | Status: SHIPPED | OUTPATIENT
Start: 2025-08-21

## 2025-08-21 ASSESSMENT — ACTIVITIES OF DAILY LIVING (ADL)
BATHING: INDEPENDENT
TAKING_MEDICATION: INDEPENDENT
MANAGING_FINANCES: INDEPENDENT
DRESSING: INDEPENDENT
DOING_HOUSEWORK: INDEPENDENT
GROCERY_SHOPPING: INDEPENDENT

## 2025-08-21 ASSESSMENT — PATIENT HEALTH QUESTIONNAIRE - PHQ9
1. LITTLE INTEREST OR PLEASURE IN DOING THINGS: NOT AT ALL
2. FEELING DOWN, DEPRESSED OR HOPELESS: NOT AT ALL
SUM OF ALL RESPONSES TO PHQ9 QUESTIONS 1 AND 2: 0

## 2025-08-22 PROBLEM — N18.31 STAGE 3A CHRONIC KIDNEY DISEASE (MULTI): Status: RESOLVED | Noted: 2024-05-30 | Resolved: 2025-08-22

## 2025-08-22 ASSESSMENT — ENCOUNTER SYMPTOMS
PALPITATIONS: 0
LIGHT-HEADEDNESS: 0
HEADACHES: 0
CHEST TIGHTNESS: 0
DIFFICULTY URINATING: 0
DIZZINESS: 0
FATIGUE: 0
ABDOMINAL PAIN: 0
ACTIVITY CHANGE: 0
COUGH: 0
SHORTNESS OF BREATH: 0
UNEXPECTED WEIGHT CHANGE: 0

## 2025-09-03 ENCOUNTER — APPOINTMENT (OUTPATIENT)
Dept: CARDIOLOGY | Facility: HOSPITAL | Age: 70
End: 2025-09-03
Payer: MEDICARE

## 2025-10-23 ENCOUNTER — APPOINTMENT (OUTPATIENT)
Facility: CLINIC | Age: 70
End: 2025-10-23
Payer: MEDICARE

## 2026-02-23 ENCOUNTER — APPOINTMENT (OUTPATIENT)
Dept: PRIMARY CARE | Facility: CLINIC | Age: 71
End: 2026-02-23
Payer: MEDICARE